# Patient Record
Sex: MALE | Race: WHITE | NOT HISPANIC OR LATINO | Employment: FULL TIME | URBAN - METROPOLITAN AREA
[De-identification: names, ages, dates, MRNs, and addresses within clinical notes are randomized per-mention and may not be internally consistent; named-entity substitution may affect disease eponyms.]

---

## 2019-05-17 ENCOUNTER — TRANSCRIBE ORDERS (OUTPATIENT)
Dept: ADMINISTRATIVE | Facility: HOSPITAL | Age: 59
End: 2019-05-17

## 2019-05-17 DIAGNOSIS — R31.9 HEMATURIA, UNSPECIFIED TYPE: Primary | ICD-10-CM

## 2019-05-25 ENCOUNTER — HOSPITAL ENCOUNTER (OUTPATIENT)
Dept: RADIOLOGY | Facility: HOSPITAL | Age: 59
Discharge: HOME | End: 2019-05-25
Payer: COMMERCIAL

## 2019-05-25 DIAGNOSIS — R31.9 HEMATURIA, UNSPECIFIED TYPE: ICD-10-CM

## 2019-05-25 PROCEDURE — 76770 US EXAM ABDO BACK WALL COMP: CPT

## 2019-06-10 ENCOUNTER — OFFICE VISIT (OUTPATIENT)
Dept: UROLOGY | Facility: MEDICAL CENTER | Age: 59
End: 2019-06-10
Payer: COMMERCIAL

## 2019-06-10 VITALS
BODY MASS INDEX: 21.67 KG/M2 | DIASTOLIC BLOOD PRESSURE: 68 MMHG | HEART RATE: 64 BPM | WEIGHT: 160 LBS | SYSTOLIC BLOOD PRESSURE: 122 MMHG | HEIGHT: 72 IN

## 2019-06-10 DIAGNOSIS — N28.1 RENAL CYST, LEFT: ICD-10-CM

## 2019-06-10 DIAGNOSIS — R31.0 GROSS HEMATURIA: Primary | ICD-10-CM

## 2019-06-10 DIAGNOSIS — N32.9 LESION OF BLADDER: ICD-10-CM

## 2019-06-10 DIAGNOSIS — N50.89 EPIDIDYMAL MASS: ICD-10-CM

## 2019-06-10 LAB
SL AMB  POCT GLUCOSE, UA: ABNORMAL
SL AMB LEUKOCYTE ESTERASE,UA: ABNORMAL
SL AMB POCT BILIRUBIN,UA: ABNORMAL
SL AMB POCT BLOOD,UA: ABNORMAL
SL AMB POCT CLARITY,UA: ABNORMAL
SL AMB POCT COLOR,UA: ABNORMAL
SL AMB POCT KETONES,UA: ABNORMAL
SL AMB POCT NITRITE,UA: ABNORMAL
SL AMB POCT PH,UA: 7.5
SL AMB POCT SPECIFIC GRAVITY,UA: 1.02
SL AMB POCT URINE PROTEIN: 100
SL AMB POCT UROBILINOGEN: 1

## 2019-06-10 PROCEDURE — 99205 OFFICE O/P NEW HI 60 MIN: CPT | Performed by: UROLOGY

## 2019-06-10 PROCEDURE — 81003 URINALYSIS AUTO W/O SCOPE: CPT | Performed by: UROLOGY

## 2019-06-10 RX ORDER — MULTIVITAMIN
1 TABLET ORAL DAILY
COMMUNITY

## 2019-06-12 ENCOUNTER — TELEPHONE (OUTPATIENT)
Dept: UROLOGY | Facility: CLINIC | Age: 59
End: 2019-06-12

## 2019-06-13 PROBLEM — N32.9 LESION OF BLADDER: Status: ACTIVE | Noted: 2019-06-13

## 2019-06-17 ENCOUNTER — TRANSCRIBE ORDERS (OUTPATIENT)
Dept: ADMINISTRATIVE | Facility: HOSPITAL | Age: 59
End: 2019-06-17

## 2019-06-17 ENCOUNTER — HOSPITAL ENCOUNTER (OUTPATIENT)
Dept: RADIOLOGY | Facility: HOSPITAL | Age: 59
Discharge: HOME | End: 2019-06-17
Payer: COMMERCIAL

## 2019-06-17 DIAGNOSIS — N32.9 LESION OF BLADDER: ICD-10-CM

## 2019-06-17 DIAGNOSIS — N28.1 RENAL CYST, LEFT: ICD-10-CM

## 2019-06-17 DIAGNOSIS — N50.89 EPIDIDYMAL MASS: ICD-10-CM

## 2019-06-17 DIAGNOSIS — R31.0 GROSS HEMATURIA: ICD-10-CM

## 2019-06-17 PROCEDURE — 74178 CT ABD&PLV WO CNTR FLWD CNTR: CPT

## 2019-06-17 PROCEDURE — 76870 US EXAM SCROTUM: CPT

## 2019-06-17 PROCEDURE — 71046 X-RAY EXAM CHEST 2 VIEWS: CPT

## 2019-06-17 RX ADMIN — IOHEXOL 100 ML: 350 INJECTION, SOLUTION INTRAVENOUS at 11:26

## 2019-06-24 RX ORDER — ACETAMINOPHEN 325 MG/1
650 TABLET ORAL EVERY 6 HOURS PRN
COMMUNITY

## 2019-07-01 PROCEDURE — NC001 PR NO CHARGE: Performed by: UROLOGY

## 2019-07-01 NOTE — H&P
History and physical examination    Assessment/Plan      Assessment:  Gross painless hematuria  Bladder mass on ultrasound  Left mildly complex renal cyst possible rim calcification  BPH with lower urinary tract symptoms  Right epididymal mass  Plan:  Cystoscopy TURBT intravesical mitomycin-C  CT urogram  Eventual treatment for BPH  Scrotal ultrasound for read epididymal mass     History of Present Illness          HPI:  Quique Brooks is a 60 y  o  male who presents with a history of intermittent gross painless hematuria   The patient notes that approximately 2 weeks ago he urinated noticed gross blood in his urine with clots   This resolved spontaneously but since that time he has had at least 2 other episodes of transient gross painless hematuria   The patient was seen at the doctor is in clinic 05/12/2019 at which time blood work revealed a normal creatinine approximately 0 9 and a normal PSA of 1 1   Renal ultrasound however identified a mildly complex left renal cyst with some rim calcification and internal septation as well as a 1 3 cm hypoechoic bladder mass   The patient is now admitted for cystoscopy probable TURBT and intravesical instillation of mitomycin-C   CT urogram will be ordered prior to his admission      The patient also has an AUA symptom score 14 noting nocturia once nightly with a 3/5 weakening of the urinary stream a 4/5 urgency and intermittency quotient with a 2/5 frequency quotient  The patient and I discussed potential risks for the above-noted procedures including infection bleeding perforation stricture need for prolonged catheterization possible local or distant spread of bladder cancer possible false negatives and possible complications of intravenous contrast including renal failure or allergy   The patient agrees to the above procedures      Review of Systems   Constitutional: Negative     HENT: Negative     Eyes: Negative     Respiratory: Negative     Cardiovascular: Negative     Gastrointestinal: Negative     Endocrine: Negative     Genitourinary: Positive for frequency, hematuria and urgency  Skin: Negative     Allergic/Immunologic: Negative     Neurological: Negative     Hematological: Negative     Psychiatric/Behavioral: Negative           Historical Information          Medical History           Past Medical History:   Diagnosis Date    Back problem           Surgical History             Past Surgical History:   Procedure Laterality Date    LUMBAR DISC SURGERY   1994    LUMBAR DISC SURGERY   55         Social History          Social History              Substance and Sexual Activity   Alcohol Use Yes    Frequency: 2-3 times a week    Drinks per session: 1 or 2      Social History            Substance and Sexual Activity   Drug Use Not on file      Social History            Tobacco Use   Smoking Status Never Smoker   Smokeless Tobacco Never Used      Family History:             Family History   Problem Relation Age of Onset    Liver cancer Mother      Heart disease Father           Meds/Allergies   all medications and allergies reviewed  No Known Allergies     Objective   Vitals: Blood pressure 122/68, pulse 64, height 6' (1 829 m), weight 72 6 kg (160 lb)     [unfilled]           Invasive Devices      None                      Physical Exam   Constitutional: He is oriented to person, place, and time  He appears well-developed and well-nourished  No distress  HENT:   Head: Normocephalic and atraumatic  Eyes: Pupils are equal, round, and reactive to light  EOM are normal    Neck: Normal range of motion  Neck supple  Cardiovascular: Normal rate, regular rhythm, normal heart sounds and intact distal pulses  Exam reveals no gallop and no friction rub    No murmur heard  Pulmonary/Chest: Effort normal and breath sounds normal  No stridor  No respiratory distress  He has no wheezes  He has no rales  He exhibits no tenderness     Abdominal: Soft  Bowel sounds are normal  He exhibits no distension and no mass  There is no tenderness  There is no rebound and no guarding  No hernia  Genitourinary:   Genitourinary Comments: Phallus normal circumcised without cutaneous lesions   Meatus patent normally placed   Testes adnexa and scrotum all normal without masses induration tenderness   There is a firm round 1 cm mass involving the head of the right epididymis consistent with either a solid or cystic lesion of the epididymal head   Digital rectal examination reveals normal anal verge normal anal sphincter tone no palpable rectal masses and a 35-40 g a nodular nontender prostate   Neurological: He is alert and oriented to person, place, and time  Skin: Skin is warm and dry  He is not diaphoretic  Psychiatric: He has a normal mood and affect  His behavior is normal  Judgment and thought content normal    Vitals reviewed         Lab Results: I have personally reviewed pertinent reports     Imaging: I have personally reviewed pertinent reports            US kidney and bladder   Status: Final result   PACS Images       Show images for US kidney and bladder   Study Result      RENAL ULTRASOUND     INDICATION:   R31 9: Hematuria, unspecified      COMPARISON: None     TECHNIQUE:   Ultrasound of the retroperitoneum was performed with a curvilinear transducer utilizing volumetric sweeps and still imaging techniques       FINDINGS:     KIDNEYS:  Symmetric and normal size  Right kidney:  11 6 x 4 9 cm  Left kidney:  11 2 x 6 1 cm      Right kidney  Normal echogenicity and contour  No suspicious masses detected  No hydronephrosis  No shadowing calculi  No perinephric fluid collections      Left kidney  Normal echogenicity and contour  No suspicious masses detected   1 1 x 1 0 x 0 8 cm hypoechoic focus at the lower pole with possible mild rim calcification and thin internal septation  No hydronephrosis  No shadowing calculi    No perinephric fluid collections      URETERS:  Nonvisualized      BLADDER:   Normally distended  Hypoechoic mass noted at the right anterior aspect of the bladder measuring 1 3 x 1 3 x 1 1 cm  No definite internal vascularity  Bilateral ureteral jets detected  Moderate prominence of the prostate gland with scattered calcifications  Focal prominence of the prostate gland anteriorly which impresses on the posterior aspect of the bladder      IMPRESSION:  1   Hypoechoic mass at the right anterior aspect of the bladder wall measuring up to 1 3 cm  Neoplasm should be excluded  Recommend follow-up with cystoscopy  2  Delbra Lindau complex cyst suggested in the left kidney measuring up to 1 1 cm in size  3  Enlarged prostate      The study was marked in [facility] for immediate notification      Workstation performed: [id number]      Imaging      US kidney and bladder (Order: [id number]) - 5/25/2019   Result History      US kidney and bladder (Order #[id number]) on 5/25/2019 - Order Result History Report   Order Report      Order [id number]  Reason for Exam      Dx:  Hematuria, unspecified type [R31 9 (ICD-10-CM)]   Signed by      Signed Date/Time   Phone Pager   Katlin Zhang 5/25/2019 14:18 [phone]     Exam Information                      Status Exam Begun   Exam Ended     Final [99] 5/25/2019 11:53 5/25/2019 12:14   External Results Report      Open External Results Report    Encounter      View Encounter                    Patient Care Timeline      No data selected in time range   Pre-op Summary      Pre-op            Recovery Summary      Recovery              Routing History      None         EKG, Pathology, and Other Studies: I have personally reviewed pertinent reports     VTE Prophylaxis: Sequential compression device (Venodyne)

## 2019-07-02 ENCOUNTER — APPOINTMENT (OUTPATIENT)
Dept: LAB | Facility: HOSPITAL | Age: 59
End: 2019-07-02
Payer: COMMERCIAL

## 2019-07-02 ENCOUNTER — OFFICE VISIT (OUTPATIENT)
Dept: LAB | Facility: HOSPITAL | Age: 59
End: 2019-07-02

## 2019-07-02 DIAGNOSIS — N32.9 LESION OF BLADDER: ICD-10-CM

## 2019-07-02 PROCEDURE — 87086 URINE CULTURE/COLONY COUNT: CPT

## 2019-07-03 LAB — BACTERIA UR CULT: NORMAL

## 2019-07-03 NOTE — PROGRESS NOTES
There are no diagnoses linked to this encounter  Assessment and plan:       1  Bladder lesion  -  Patient is scheduled to undergo cystoscopy with trans urethral resection of bladder tumor on 07/12/2019 with Dr Lew Moyer  -  Imaging was performed continuing to indicate a bladder lesion, no metastatic disease identified   -  Risks and benefits of the procedure were discussed including cardiopulmonary risk, DVT, infection, bleeding, pain, damage to nearby anatomical structures, amongst others  Claude Giron PA-C      Chief Complaint     Chief Complaint   Patient presents with    Pre-op Exam     gross hematuria/dr Marcie Santiago         History of Present Illness     Connie Ku is a 60 y o  male patient known to Dr Lew Moyer for a history of gross hematuria  He was seen in consultation on 06/10/2019 reporting transient episodes of gross hematuria with clots present  Blood work had been done prior revealing a normal creatinine of 0 9 as well as a PSA of 1 1  Renal ultrasound had been completed identifying a mildly complex left renal cyst with some calcification and internal septation  Additionally, it identified 1 3 cm hypoechoic bladder mass  Chest x-ray, CT urogram, an ultrasound of the scrotum and testicles were performed on 06/17/2019  Chest x-ray was negative for any abnormalities  Ultrasound of the scrotum and testicles was positive only for small bilateral hydroceles and a left varicocele  CT urogram continues to demonstrate a mass along the wall of the urinary bladder as well as prostatic enlargement  This showed no evidence of metastatic disease  Urinalysis performed 7/2/2019 was positive for red blood cells and 0-1 WBC/hpf  This has been sent for culture which was negative  Patient takes no prescription medications  He currently reports taking Tylenol as needed, multivitamin, and saw palmetto      Patient has no smoking history, he reports no chemical exposure history, he works as an   His past medical history includes a couple of procedures for his back  He does not have a primary care provider  Laboratory     No results found for: CREATININE    No results found for: PSA    No results found for this or any previous visit (from the past 1 hour(s))  Review of Systems     Review of Systems   Constitutional: Negative for chills and fever  HENT: Negative  Respiratory: Negative for shortness of breath  Cardiovascular: Negative for chest pain  Gastrointestinal: Negative for constipation, diarrhea, nausea and vomiting  Genitourinary: Positive for frequency, hematuria (no clots for the last 10 days) and urgency  Negative for difficulty urinating, dysuria, enuresis and flank pain  Musculoskeletal: Positive for back pain  Allergies     No Known Allergies    Physical Exam     Physical Exam   Constitutional: He is oriented to person, place, and time  He appears well-developed and well-nourished  No distress  HENT:   Head: Normocephalic and atraumatic  Right Ear: External ear normal    Left Ear: External ear normal    Nose: Nose normal    Eyes: Right eye exhibits no discharge  Left eye exhibits no discharge  No scleral icterus  Cardiovascular: Normal rate, regular rhythm, normal heart sounds and intact distal pulses  Exam reveals no gallop and no friction rub  No murmur heard  Pulmonary/Chest: Effort normal and breath sounds normal  No stridor  No respiratory distress  He has no wheezes  He has no rales  Neurological: He is alert and oriented to person, place, and time  Skin: Skin is warm and dry  He is not diaphoretic  Psychiatric: He has a normal mood and affect  His behavior is normal  Judgment and thought content normal    Nursing note and vitals reviewed          Vital Signs     Vitals:    07/05/19 1515   BP: 112/88   BP Location: Left arm   Patient Position: Sitting   Cuff Size: Adult   Pulse: 66   Weight: 75 8 kg (167 lb) Height: 6' (1 829 m)         Current Medications       Current Outpatient Medications:     acetaminophen (TYLENOL) 325 mg tablet, Take 650 mg by mouth every 6 (six) hours as needed for mild pain, Disp: , Rfl:     Multiple Vitamin (MULTIVITAMIN) tablet, Take 1 tablet by mouth daily, Disp: , Rfl:     Saw Palmetto, Serenoa repens, (SAW PALMETTO PO), Take by mouth daily, Disp: , Rfl:       Active Problems     Patient Active Problem List   Diagnosis    Lesion of bladder         Past Medical History     Past Medical History:   Diagnosis Date    Back pain     Bladder tumor     DDD (degenerative disc disease), lumbar     Hematuria     Wears glasses          Surgical History     Past Surgical History:   Procedure Laterality Date    DENTAL SURGERY      LUMBAR [address]         Family History     Family History   Problem Relation Age of Onset    Liver cancer Mother     Heart disease Father          Social History     Social History       Radiology     CHEST      INDICATION:   N32 9: Bladder disorder, unspecified      COMPARISON:  None     EXAM PERFORMED/VIEWS:  XR CHEST PA & LATERAL  Images: 2     FINDINGS:     Cardiomediastinal silhouette appears unremarkable  Pulmonary vessels are normal      The lungs are clear  No pneumothorax or pleural effusion      Osseous structures appear within normal limits for patient age      IMPRESSION:     No acute cardiopulmonary disease      CT ABDOMEN AND PELVIS WITH AND WITHOUT IV CONTRAST     INDICATION:   R31 0: Gross hematuria  N32 9: Bladder disorder, unspecified  N28 1: Cyst of kidney, acquired    Concern for bladder mass seen on ultrasound        COMPARISON:  Ultrasound, dated May 25, 2019      TECHNIQUE: Initial CT of the abdomen and pelvis was performed without intravenous contrast   Subsequent dynamic CT evaluation of the abdomen and pelvis was performed after the administration of intravenous contrast in both nephrographic and delayed   phases after the administration of intravenous contrast   Axial, sagittal, and coronal 2D reformatted images were created from the source data and submitted for interpretation       Radiation dose length product (DLP) for this visit:  1033 63 mGy-cm   This examination, like all CT scans performed in the [facility], was performed utilizing techniques to minimize radiation dose exposure, including the use of   iterative reconstruction and automated exposure control      IV Contrast:  100 mL of iohexol (OMNIPAQUE)  Enteric Contrast:  Enteric contrast was not administered      FINDINGS:     ABDOMEN     RIGHT KIDNEY AND URETER:  No solid renal mass  No detectable urothelial mass  No hydronephrosis or hydroureter  No urinary tract calculi  No perinephric collection      LEFT KIDNEY AND URETER:  No solid renal mass  No detectable urothelial mass  Note is made of a left lower pole simple cyst   No hydronephrosis or hydroureter  No urinary tract calculi  No perinephric collection      URINARY BLADDER:  A hyperattenuating mass along the right anterolateral wall of the urinary bladder measures approximately 3 x 1 5 cm in greatest transverse dimensions (series 3, image 149)  No calculi         LOWER CHEST:  No clinically significant abnormality identified in the visualized lower chest      LIVER/BILIARY TREE:  Incidental note is made of either a simple cyst or a 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      GALLBLADDER:  No calcified gallstones  No pericholecystic inflammatory change      SPLEEN:  Unremarkable      PANCREAS:  Unremarkable      ADRENAL GLANDS:  Unremarkable      STOMACH AND BOWEL:  Unremarkable      ABDOMINOPELVIC CAVITY:  No ascites  No free intraperitoneal air   No lymphadenopathy      VESSELS:  Unremarkable for patient's age      PELVIS     REPRODUCTIVE ORGANS:  The prostate is enlarged      APPENDIX: No findings to suggest appendicitis      ABDOMINAL WALL/INGUINAL REGIONS:  Unremarkable      OSSEOUS STRUCTURES:  There are age appropriate degenerative changes  No acute fracture or destructive osseous lesion      IMPRESSION:     1   Redemonstrated mass along the right anterolateral wall of the urinary bladder, measuring approximately 3 x 1 5 cm in greatest transverse dimensions  Once again, tissue sampling via cystoscopy is recommended  2   No evidence of metastatic disease  3   Prostatic enlargement  SCROTAL ULTRASOUND     INDICATION:    N50 9: Disorder of male genital organs, unspecified      COMPARISON: None     TECHNIQUE:   Ultrasound the scrotal contents was performed with a high frequency linear transducer utilizing volumetric sweep imaging as well as standard still image techniques  Imaging performed in longitudinal and transverse orientation  Color and   spectral Doppler evaluation also performed bilaterally      FINDINGS:     TESTES:   Testes are symmetric and normal in size      RIGHT testis = 4 8 x 2 1 x 3 6 cm   Normal contour with homogeneous smooth echotexture  No intratesticular mass lesion or calcifications      LEFT testis = 5 0 x 2 1 x 3 8 cm  Normal contour with homogeneous smooth echotexture  No intratesticular mass lesion or calcifications      Doppler flow within both testes is present and symmetric      EPIDIDYMIDES:   The epididymides are normal in size  Both epididymides L heads contain cysts  The right epididymal head measures 1 0 x 1 1 cm and contains a 1 0 x 0 8 cm cyst   This cyst demonstrates a punctate focus of echogenicity along its posterior wall suggesting   calcification      The left epididymal head cyst is smaller, measuring 0 6 x 0 4 cm  Doppler ultrasound demonstrates normal blood flow  No epididymal lesions      HYDROCELE:  Small bilateral hydroceles      VARICOCELE:  Small left varicocele is present      SCROTUM:  Scrotal thickness and appearance within normal limits   No evidence for extratesticular mass or hernia demonstrated      IMPRESSION:     Bilateral epididymal head cysts, measuring approximately 1 cm on the right and 0 6 cm on the left      Unremarkable testes      Small bilateral hydroceles and left varicocele

## 2019-07-05 ENCOUNTER — OFFICE VISIT (OUTPATIENT)
Dept: UROLOGY | Facility: CLINIC | Age: 59
End: 2019-07-05
Payer: COMMERCIAL

## 2019-07-05 VITALS
HEART RATE: 66 BPM | WEIGHT: 167 LBS | SYSTOLIC BLOOD PRESSURE: 112 MMHG | DIASTOLIC BLOOD PRESSURE: 88 MMHG | BODY MASS INDEX: 22.62 KG/M2 | HEIGHT: 72 IN

## 2019-07-05 DIAGNOSIS — N32.9 LESION OF BLADDER: Primary | ICD-10-CM

## 2019-07-05 PROCEDURE — 99213 OFFICE O/P EST LOW 20 MIN: CPT | Performed by: PHYSICIAN ASSISTANT

## 2019-07-11 ENCOUNTER — ANESTHESIA EVENT (OUTPATIENT)
Dept: PERIOP | Facility: HOSPITAL | Age: 59
End: 2019-07-11
Payer: COMMERCIAL

## 2019-07-12 ENCOUNTER — ANESTHESIA (OUTPATIENT)
Dept: PERIOP | Facility: HOSPITAL | Age: 59
End: 2019-07-12
Payer: COMMERCIAL

## 2019-07-12 ENCOUNTER — HOSPITAL ENCOUNTER (OUTPATIENT)
Facility: HOSPITAL | Age: 59
Setting detail: OUTPATIENT SURGERY
Discharge: HOME | End: 2019-07-13
Attending: UROLOGY | Admitting: UROLOGY
Payer: COMMERCIAL

## 2019-07-12 DIAGNOSIS — N32.9 LESION OF BLADDER: ICD-10-CM

## 2019-07-12 PROCEDURE — 52240 CYSTOSCOPY AND TREATMENT: CPT | Performed by: UROLOGY

## 2019-07-12 PROCEDURE — 88307 TISSUE EXAM BY PATHOLOGIST: CPT | Performed by: PATHOLOGY

## 2019-07-12 RX ORDER — ONDANSETRON 2 MG/ML
INJECTION INTRAMUSCULAR; INTRAVENOUS AS NEEDED
Status: DISCONTINUED | OUTPATIENT
Start: 2019-07-12 | End: 2019-07-12 | Stop reason: SURG

## 2019-07-12 RX ORDER — ONDANSETRON 2 MG/ML
4 INJECTION INTRAMUSCULAR; INTRAVENOUS EVERY 6 HOURS PRN
Status: DISCONTINUED | OUTPATIENT
Start: 2019-07-12 | End: 2019-07-13 | Stop reason: HOSPADM

## 2019-07-12 RX ORDER — OXYCODONE HYDROCHLORIDE AND ACETAMINOPHEN 5; 325 MG/1; MG/1
1 TABLET ORAL EVERY 4 HOURS PRN
Status: DISCONTINUED | OUTPATIENT
Start: 2019-07-12 | End: 2019-07-13 | Stop reason: HOSPADM

## 2019-07-12 RX ORDER — DEXAMETHASONE SODIUM PHOSPHATE 4 MG/ML
INJECTION, SOLUTION INTRA-ARTICULAR; INTRALESIONAL; INTRAMUSCULAR; INTRAVENOUS; SOFT TISSUE AS NEEDED
Status: DISCONTINUED | OUTPATIENT
Start: 2019-07-12 | End: 2019-07-12 | Stop reason: SURG

## 2019-07-12 RX ORDER — OXYBUTYNIN CHLORIDE 5 MG/1
5 TABLET ORAL 2 TIMES DAILY PRN
Status: DISCONTINUED | OUTPATIENT
Start: 2019-07-12 | End: 2019-07-13 | Stop reason: HOSPADM

## 2019-07-12 RX ORDER — CEFAZOLIN SODIUM 2 G/50ML
2000 SOLUTION INTRAVENOUS ONCE
Status: COMPLETED | OUTPATIENT
Start: 2019-07-12 | End: 2019-07-12

## 2019-07-12 RX ORDER — SULFAMETHOXAZOLE AND TRIMETHOPRIM 800; 160 MG/1; MG/1
1 TABLET ORAL DAILY
Qty: 7 TABLET | Refills: 0 | Status: SHIPPED | OUTPATIENT
Start: 2019-07-12 | End: 2019-07-19

## 2019-07-12 RX ORDER — ONDANSETRON 2 MG/ML
4 INJECTION INTRAMUSCULAR; INTRAVENOUS ONCE AS NEEDED
Status: DISCONTINUED | OUTPATIENT
Start: 2019-07-12 | End: 2019-07-12 | Stop reason: HOSPADM

## 2019-07-12 RX ORDER — MAGNESIUM HYDROXIDE 1200 MG/15ML
LIQUID ORAL AS NEEDED
Status: DISCONTINUED | OUTPATIENT
Start: 2019-07-12 | End: 2019-07-12 | Stop reason: HOSPADM

## 2019-07-12 RX ORDER — EPHEDRINE SULFATE 50 MG/ML
INJECTION INTRAVENOUS AS NEEDED
Status: DISCONTINUED | OUTPATIENT
Start: 2019-07-12 | End: 2019-07-12 | Stop reason: SURG

## 2019-07-12 RX ORDER — DEXTROSE, SODIUM CHLORIDE, AND POTASSIUM CHLORIDE 5; .45; .15 G/100ML; G/100ML; G/100ML
75 INJECTION INTRAVENOUS CONTINUOUS
Status: DISCONTINUED | OUTPATIENT
Start: 2019-07-12 | End: 2019-07-13 | Stop reason: HOSPADM

## 2019-07-12 RX ORDER — OXYBUTYNIN CHLORIDE 5 MG/1
5 TABLET ORAL 2 TIMES DAILY PRN
Qty: 25 TABLET | Refills: 0 | Status: SHIPPED | OUTPATIENT
Start: 2019-07-12

## 2019-07-12 RX ORDER — ACETAMINOPHEN 325 MG/1
650 TABLET ORAL EVERY 6 HOURS PRN
Status: DISCONTINUED | OUTPATIENT
Start: 2019-07-12 | End: 2019-07-13 | Stop reason: HOSPADM

## 2019-07-12 RX ORDER — PROPOFOL 10 MG/ML
INJECTION, EMULSION INTRAVENOUS AS NEEDED
Status: DISCONTINUED | OUTPATIENT
Start: 2019-07-12 | End: 2019-07-12 | Stop reason: SURG

## 2019-07-12 RX ORDER — HYDROMORPHONE HYDROCHLORIDE 2 MG/ML
INJECTION, SOLUTION INTRAMUSCULAR; INTRAVENOUS; SUBCUTANEOUS AS NEEDED
Status: DISCONTINUED | OUTPATIENT
Start: 2019-07-12 | End: 2019-07-12 | Stop reason: SURG

## 2019-07-12 RX ORDER — FENTANYL CITRATE/PF 50 MCG/ML
25 SYRINGE (ML) INJECTION
Status: DISCONTINUED | OUTPATIENT
Start: 2019-07-12 | End: 2019-07-12 | Stop reason: HOSPADM

## 2019-07-12 RX ORDER — MIDAZOLAM HYDROCHLORIDE 1 MG/ML
INJECTION INTRAMUSCULAR; INTRAVENOUS AS NEEDED
Status: DISCONTINUED | OUTPATIENT
Start: 2019-07-12 | End: 2019-07-12 | Stop reason: SURG

## 2019-07-12 RX ORDER — SODIUM CHLORIDE 9 MG/ML
125 INJECTION, SOLUTION INTRAVENOUS CONTINUOUS
Status: DISCONTINUED | OUTPATIENT
Start: 2019-07-12 | End: 2019-07-12

## 2019-07-12 RX ORDER — FENTANYL CITRATE 50 UG/ML
INJECTION, SOLUTION INTRAMUSCULAR; INTRAVENOUS AS NEEDED
Status: DISCONTINUED | OUTPATIENT
Start: 2019-07-12 | End: 2019-07-12 | Stop reason: SURG

## 2019-07-12 RX ADMIN — FENTANYL CITRATE 50 MCG: 50 INJECTION, SOLUTION INTRAMUSCULAR; INTRAVENOUS at 14:38

## 2019-07-12 RX ADMIN — EPHEDRINE SULFATE 10 MG: 50 INJECTION, SOLUTION INTRAVENOUS at 14:44

## 2019-07-12 RX ADMIN — PROPOFOL 200 MG: 10 INJECTION, EMULSION INTRAVENOUS at 14:38

## 2019-07-12 RX ADMIN — FENTANYL CITRATE 50 MCG: 50 INJECTION, SOLUTION INTRAMUSCULAR; INTRAVENOUS at 14:47

## 2019-07-12 RX ADMIN — FENTANYL CITRATE 25 MCG: 50 INJECTION INTRAMUSCULAR; INTRAVENOUS at 16:33

## 2019-07-12 RX ADMIN — HYDROMORPHONE HYDROCHLORIDE 0.5 MG: 2 INJECTION, SOLUTION INTRAMUSCULAR; INTRAVENOUS; SUBCUTANEOUS at 15:08

## 2019-07-12 RX ADMIN — OXYCODONE HYDROCHLORIDE AND ACETAMINOPHEN 1 TABLET: 5; 325 TABLET ORAL at 18:34

## 2019-07-12 RX ADMIN — CEFAZOLIN SODIUM 2000 MG: 2 SOLUTION INTRAVENOUS at 14:32

## 2019-07-12 RX ADMIN — LIDOCAINE HYDROCHLORIDE 100 MG: 20 INJECTION, SOLUTION INTRAVENOUS at 14:38

## 2019-07-12 RX ADMIN — ONDANSETRON HYDROCHLORIDE 4 MG: 2 INJECTION, SOLUTION INTRAVENOUS at 15:23

## 2019-07-12 RX ADMIN — DEXAMETHASONE SODIUM PHOSPHATE 4 MG: 4 INJECTION, SOLUTION INTRAMUSCULAR; INTRAVENOUS at 14:49

## 2019-07-12 RX ADMIN — SODIUM CHLORIDE 125 ML/HR: 0.9 INJECTION, SOLUTION INTRAVENOUS at 12:35

## 2019-07-12 RX ADMIN — FENTANYL CITRATE 25 MCG: 50 INJECTION INTRAMUSCULAR; INTRAVENOUS at 16:28

## 2019-07-12 RX ADMIN — DEXTROSE, SODIUM CHLORIDE, AND POTASSIUM CHLORIDE 75 ML/HR: 5; .45; .15 INJECTION INTRAVENOUS at 18:06

## 2019-07-12 RX ADMIN — MIDAZOLAM 2 MG: 1 INJECTION INTRAMUSCULAR; INTRAVENOUS at 14:31

## 2019-07-12 RX ADMIN — HYDROMORPHONE HYDROCHLORIDE 0.5 MG: 2 INJECTION, SOLUTION INTRAMUSCULAR; INTRAVENOUS; SUBCUTANEOUS at 15:18

## 2019-07-12 RX ADMIN — OXYBUTYNIN CHLORIDE 5 MG: 5 TABLET ORAL at 21:48

## 2019-07-12 NOTE — OP NOTE
OPERATIVE REPORT  PATIENT NAME: Maxine Singh    :  1960  MRN: [id number]  Pt Location: [facility] OR ROOM 08    SURGERY DATE: 2019    Surgeon(s) and Role:     * Alfonso Sprague MD - Primary    Preop Diagnosis:  Lesion of bladder [N32 9]  Gross hematuria  Post-Op Diagnosis Codes:     * Lesions of bladder [N32 9]  Gross hematuria    Procedure(s) (LRB):  CYSTOSCOPY (N/A)  INSTILLATION MITOMYCIN (N/A)  TURBT (N/A)-->5cm-right lateral wall bladder    Specimen(s):  ID Type Source Tests Collected by Time Destination   1 : Bladder Tumor  Tissue Urinary Bladder TISSUE EXAM Alfonso Sprague MD 2019 1512        Estimated Blood Loss:   Minimal    Drains:  Urethral Catheter Double-lumen; Latex 20 Fr  (Active)   Number of days: 0       Anesthesia Type:   General    Operative Indications:  Lesion of bladder [N32 9]  Gross hematuria    Operative Findings:  Normal urethra without stricture or lesion although the urethra was somewhat small in caliber and required urethral dilation with Carleene Cordial sounds to a 32 German size in order to accommodate a 24 Western Nikole resectoscope  Moderate BPH with bilobar enlargement of the lateral lobes of the prostate but no significant obstruction visually to the bladder outlet  Urinary bladder revealed a raised lesion appeared somewhat papillary but also had indistinct margins and a sessile character approximately 55 cm in size with a smaller satellite lesion higher up in more distal within the urinary bladder on the right lateral wall  On the left posterior wall there was a hemorrhagic lesion fossa which was also resected  Complications:   None    Procedure and Technique:  The patient was seen in the holding area and we discussed cystoscopy TURBT mitomycin-C as well as discussed his CT urogram findings and his ultrasound of the scrotum and those findings  The patient expressed understanding of all of the findings discussed as outlined in both of of those reports      The patient was taken to the operating room identified by the surgeon placed on the operating table in the dorsal lithotomy position and general LMA anesthesia induced  After time-out cystourethroscopy took place using a 20 Chadian cystoscope with 30 and 70 degree lenses  The anterior urethra was within normal limits without stricture lesion  The posterior urethra revealed bilobar enlargement of the lateral lobes of the prostate without visual occlusion of the bladder outlet  Examination of the urinary bladder with 30 and 70 degree lenses took place  Ureteral orifices were normal both in configuration and position with clear efflux bilaterally  There was no trauma to the ureteral orifices throughout the entire procedure  The mucosa of the urinary bladder was somewhat erythematous particularly at the trigone  On the right  lateral wall of the bladder there was a raised lesion somewhat papillary and somewhat sessile with very indistinct borders measuring approximately 5 cm by estimate  More distal and more anterior was a similar lesion on the right wall of the bladder as well  On the left posterior wall of the bladder there was a hemorrhagic region that did not have the appearance of a malignancy although the right lesion described above did have the appearance of bladder cancer possibly invasive  After completion of cystourethroscopy the cystoscope was removed from the patient and a 25 Western Nikole resectoscopic obturator and sheath was attempted to be placed per urethra into the urinary bladder  This was not able to be performed because of tightness of the ureter and therefore gentle sequential dilation using Aletta Keel sounds of the urethra took place  Dilation took place from 21 Western Nikole up to 26 Western Nikole size inclusive Pauline and then the 24 Western Nikole obturator and sheath was placed per urethra into the urinary bladder without difficulty    Using a type 1 cutting current and 100 the bladder tumor will on the right lateral wall of the bladder as well as the satellite lesion was resected widely and deep into the muscular layer the bladder  There was no evidence of perforation and no evidence of fat at the periphery or at the base of the resection  There was minimal bleeding that was controlled with the coagulation current  No visible tumor was left remaining  After completion of the TURBT hemorrhagic lesion on the left posterior wall the bladder was also resected and the base and periphery fulgurated generously as was the right lateral wall of the bladder  At the end of the procedure the [place] evacuator was used to evacuate all specimen from the bladder and then after the bladder was emptied all equipment removed from the bladder and urethra 40 mg of mitomycin was inserted and left indwelling for 30 minutes after Sandoval catheter 20 Beninese in size was placed per urethra and left to straight drainage  The patient was returned to PACU and after his mitomycin was drained the Sandoval catheter was left in place for bladder healing  Because of the extensive resection the patient will be kept overnight for observation  There were no complications     I was present for the entire procedure and A qualified resident physician was not available    Patient Disposition:  PACU     SIGNATURE: Donte Robert MD  DATE: July 12, 2019  TIME: 3:43 PM

## 2019-07-12 NOTE — INTERVAL H&P NOTE
H&P reviewed  After examining the patient I find no changes in the patients condition since the H&P had been written  /73   Pulse 61   Temp (!) 97 1 °F (36 2 °C) (Tympanic)   Resp 16   Ht 6' (1 829 m)   Wt 74 8 kg (165 lb)   SpO2 99%   BMI 22 38 kg/m²

## 2019-07-12 NOTE — INTERIM OP NOTE
CYSTOSCOPY, INSTILLATION MITOMYCIN, TURBT  Postoperative Note  PATIENT NAME: Sahuna Bocanegra  : 1960  MRN: [id number]  [facility] OR ROOM 08    Surgery Date: 2019    Preop Diagnosis:  Lesion of bladder [N32 9]  Gross hematuria    Post-Op Diagnosis Codes:     * Lesions of bladder [N32 9]  Gross hematuria    Procedure(s) (LRB):  CYSTOSCOPY (N/A)  INSTILLATION MITOMYCIN (N/A)  TURBT (N/A)--5 cm bladder tumor-    Surgeon(s) and Role:     * Myrtis Shone, MD - Primary    Specimens:  ID Type Source Tests Collected by Time Destination   1 : Bladder Tumor  Tissue Urinary Bladder TISSUE EXAM Myrtis Shone, MD 2019 1512        Estimated Blood Loss:   Minimal    Anesthesia Type:   General     Findings:    Papillary 5 cm bladder tumor with smaller satellite lesions on right lateral wall of urinary bladder with small hemorrhagic left posterior wall lesion    Complications:   None    SIGNATURE: Myrtis Shone, MD   DATE: 2019   TIME: 3:41 PM

## 2019-07-12 NOTE — ANESTHESIA PREPROCEDURE EVALUATION
Review of Systems/Medical History  Patient summary reviewed        Cardiovascular  Negative cardio ROS    Pulmonary  Negative pulmonary ROS        GI/Hepatic  Negative GI/hepatic ROS          Negative  ROS        Endo/Other  Negative endo/other ROS      GYN  Negative gynecology ROS          Hematology  Negative hematology ROS      Musculoskeletal  Negative musculoskeletal ROS   Arthritis     Neurology  Negative neurology ROS      Psychology   Negative psychology ROS              Physical Exam    Airway    Mallampati score: II  TM Distance: >3 FB  Neck ROM: full     Dental   No notable dental hx     Cardiovascular  Comment: Negative ROS, Rhythm: regular, Rate: normal, Cardiovascular exam normal    Pulmonary  Pulmonary exam normal Breath sounds clear to auscultation,     Other Findings        Anesthesia Plan  ASA Score- 1     Anesthesia Type- general with ASA Monitors  Additional Monitors:   Airway Plan: LMA  Plan Factors-    Induction- intravenous  Postoperative Plan-     Informed Consent- Anesthetic plan and risks discussed with patient

## 2019-07-13 VITALS
TEMPERATURE: 96 F | HEIGHT: 72 IN | HEART RATE: 73 BPM | BODY MASS INDEX: 22.4 KG/M2 | SYSTOLIC BLOOD PRESSURE: 102 MMHG | DIASTOLIC BLOOD PRESSURE: 60 MMHG | RESPIRATION RATE: 18 BRPM | WEIGHT: 165.34 LBS | OXYGEN SATURATION: 97 %

## 2019-07-13 PROCEDURE — NC001 PR NO CHARGE: Performed by: UROLOGY

## 2019-07-13 PROCEDURE — 99217 PR OBSERVATION CARE DISCHARGE MANAGEMENT: CPT | Performed by: UROLOGY

## 2019-07-13 RX ADMIN — DEXTROSE, SODIUM CHLORIDE, AND POTASSIUM CHLORIDE 75 ML/HR: 5; .45; .15 INJECTION INTRAVENOUS at 07:08

## 2019-07-13 RX ADMIN — ACETAMINOPHEN 650 MG: 325 TABLET ORAL at 09:04

## 2019-07-13 RX ADMIN — OXYBUTYNIN CHLORIDE 5 MG: 5 TABLET ORAL at 08:59

## 2019-07-13 RX ADMIN — OXYCODONE HYDROCHLORIDE AND ACETAMINOPHEN 1 TABLET: 5; 325 TABLET ORAL at 03:06

## 2019-07-13 NOTE — PROGRESS NOTES
Progress Note - Urology Progress  Kamini Paez 60 y o  male MRN: [id number]  Unit/Bed#: E2 -01 Encounter: [id number]    Assessment:  Feels well POD 1    Plan:  DC to home with leg bag  Oxybutynin, bactrim      Blood pressure 102/60, pulse 73, temperature (!) 96 °F (35 6 °C), temperature source Tympanic, resp  rate 18, height 6' (1 829 m), weight 75 kg (165 lb 5 5 oz), SpO2 97 %  ,Body mass index is 22 42 kg/m²  Intake/Output Summary (Last 24 hours) at 7/13/2019 0858  Last data filed at 7/13/2019 0854  Gross per 24 hour   Intake 2000 ml   Output 2600 ml   Net -600 ml       Invasive Devices     Peripheral Intravenous Line            Peripheral IV 07/12/19 Left Forearm less than 1 day          Drain            Urethral Catheter Double-lumen; Latex 20 Fr  less than 1 day                Physical Exam: General appearance: alert and oriented, in no acute distress  Abdomen: soft, non-tender; bowel sounds normal; no masses,  no organomegaly  Male genitalia: normal    Lab, Imaging and other studies:CBC: No results found for: WBC, HGB, HCT, MCV, PLT, ADJUSTEDWBC, MCH, MCHC, RDW, MPV, NRBC  }

## 2019-07-13 NOTE — DISCHARGE SUMMARY
Discharge Summary - Ann Kothari 60 y o  male MRN: [id number]    Unit/Bed#: E2 -01 Encounter: [id number]    Admission Date: 7/12/2019     Admitting Diagnosis: Lesion of bladder [N32 9]    Discharge Date:   July 13, 2019  HPI: admitted for bladder tumor resection    Procedures Performed: No orders of the defined types were placed in this encounter  TUR bladder tumor   Hospital Course: Uneventful night after surgery  Urine light cherry, drains well  Discharge Diagnosis: Path pending  1  Lesion of bladder        Condition at Discharge: good     Discharge Medications:   See after visit summary for reconciled discharge medications provided to patient and family  Discharge instructions/Information to patient and family:   See after visit summary for information provided to patient and family  Provisions for Follow-Up Care:  See after visit summary for information related to follow-up care and any pertinent home health orders  Disposition: Home    Planned Readmission: No    Discharge Statement   I spent 15 minutes discharging the patient  This time was spent on the day of discharge  I had direct contact with the patient on the day of discharge  Additional documentation is required if more than 30 minutes were spent on discharge       Signature:   Pili Foy MD  Date: 7/13/2019 Time: 9:01 AM

## 2019-07-13 NOTE — PLAN OF CARE
Problem: Potential for Falls  Goal: Patient will remain free of falls  Description  INTERVENTIONS:  - Assess patient frequently for physical needs  -  Identify cognitive and physical deficits and behaviors that affect risk of falls    -  [place] fall precautions as indicated by assessment   - Educate patient/family on patient safety including physical limitations  - Instruct patient to call for assistance with activity based on assessment  - Modify environment to reduce risk of injury  - Consider OT/PT consult to assist with strengthening/mobility  Outcome: Progressing     Problem: PAIN - ADULT  Goal: Verbalizes/displays adequate comfort level or baseline comfort level  Description  Interventions:  - Encourage patient to monitor pain and request assistance  - Assess pain using appropriate pain scale  - Administer analgesics based on type and severity of pain and evaluate response  - Implement non-pharmacological measures as appropriate and evaluate response  - Consider cultural and social influences on pain and pain management  - Notify physician/advanced practitioner if interventions unsuccessful or patient reports new pain  Outcome: Progressing     Problem: SAFETY ADULT  Goal: Maintain or return to baseline ADL function  Description  INTERVENTIONS:  -  Assess patient's ability to carry out ADLs; assess patient's baseline for ADL function and identify physical deficits which impact ability to perform ADLs (bathing, care of mouth/teeth, toileting, grooming, dressing, etc )  - Assess/evaluate cause of self-care deficits   - Assess range of motion  - Assess patient's mobility; develop plan if impaired  - Assess patient's need for assistive devices and provide as appropriate  - Encourage maximum independence but intervene and supervise when necessary  ¯ Involve family in performance of ADLs  ¯ Assess for home care needs following discharge   ¯ Request OT consult to assist with ADL evaluation and planning for discharge  ¯ Provide patient education as appropriate  Outcome: Progressing  Goal: Maintain or return mobility status to optimal level  Description  INTERVENTIONS:  - Assess patient's baseline mobility status (ambulation, transfers, stairs, etc )    - Identify cognitive and physical deficits and behaviors that affect mobility  - Identify mobility aids required to assist with transfers and/or ambulation (gait belt, sit-to-stand, lift, walker, cane, etc )  - [place] fall precautions as indicated by assessment  - Record patient progress and toleration of activity level on Mobility SBAR; progress patient to next Phase/Stage  - Instruct patient to call for assistance with activity based on assessment  - Request Rehabilitation consult to assist with strengthening/weightbearing, etc   Outcome: Progressing     Problem: DISCHARGE PLANNING  Goal: Discharge to home or other facility with appropriate resources  Description  INTERVENTIONS:  - Identify barriers to discharge w/patient and caregiver  - Arrange for needed discharge resources and transportation as appropriate  - Identify discharge learning needs (meds, wound care, etc )  - Arrange for interpretive services to assist at discharge as needed  - Refer to Case Management Department for coordinating discharge planning if the patient needs post-hospital services based on physician/advanced practitioner order or complex needs related to functional status, cognitive ability, or social support system  Outcome: Progressing

## 2019-07-13 NOTE — DISCHARGE INSTRUCTIONS
ALL YOUR  PREVIOUS MEDS ARE THE SAME  NEW MEDS:  OXYBUTYNIN IF NEEDED FOR BLADDER PRESSURE, SPASM     ** STOP TAKING OXYBUTYNIN THE NIGHT BEFORE YOUR CATHETER COMES OUT **      EXPECT SOME BLOOD IN URINE, BURNING, FREQUENT URINATION  Sandoval Catheter Placement and Care   WHAT YOU NEED TO KNOW:   A Sandoval catheter is a sterile tube that is inserted into your bladder to drain urine  It is also called an indwelling urinary catheter  The tip of the catheter has a small balloon filled with solution that holds the catheter inside your bladder  DISCHARGE INSTRUCTIONS:   Return to the emergency department if:   · Your catheter comes out  · You suddenly have material that looks like sand in the tubing or drainage bag  · No urine is draining into the bag and you have checked the system  · You have pain in your hip, back, pelvis, or lower abdomen  · You are confused or cannot think clearly  Contact your healthcare provider if:   · You have a fever  · You have bladder spasms for more than 1 day after the catheter is placed  · You see blood in the tubing or drainage bag  · You have a rash or itching where the catheter tube is secured to your skin  · Urine leaks from or around the catheter, tubing, or drainage bag  · The closed drainage system has accidently come open or apart  · You see a layer of crystals inside the tubing  · You have questions or concerns about your condition or care  Care for your Sandoval catheter:   · Clean your genital area 2 times every day  Clean your catheter and the area around where it was inserted  Use soap and water  Clean your anal opening and catheter area after every bowel movement  · Secure the catheter tube  so you do not pull or move the catheter  This helps prevent pain and bladder spasms  Healthcare providers will show you how to use medical tape or a strap to secure the catheter tube to your body  · Keep a closed drainage system    Your Sandoval catheter should always be attached to the drainage bag to form a closed system  Do not disconnect any part of the closed system unless you need to change the bag  Care for your drainage bag:   · Ask if a leg bag is right for you  A leg bag can be worn under your clothes  Ask your healthcare provider for more information about a leg bag  · Keep the drainage bag below the level of your waist   This helps stop urine from moving back up the tubing and into your bladder  Do not loop or kink the tubing  This can cause urine to back up and collect in your bladder  Do not let the drainage bag touch or lie on the floor  · Empty the drainage bag when needed  The weight of a full drainage bag can be painful  Empty the drainage bag every 3 to 6 hours or when it is ? full  · Clean and change the drainage bag as directed  Ask your healthcare provider how often you should change the drainage bag and what cleaning solution to use  Wear disposable gloves when you change the bag  Do not allow the end of the catheter or tubing to touch anything  Clean the ends with an alcohol pad before you reconnect them  What to do if problems develop:   · No urine is draining into the bag:      ¨ Check for kinks in the tubing and straighten them out  ¨ Check the tape or strap used to secure the catheter tube to your skin  Make sure it is not blocking the tube  ¨ Make sure you are not sitting or lying on the tubing  ¨ Make sure the urine bag is hanging below the level of your waist     · Urine leaks from or around the catheter, tubing, or drainage bag:  Check if the closed drainage system has accidently come open or apart  Clean the catheter and tubing ends with a new alcohol pad and reconnect them  Prevent an infection:   · Wash your hands often  Wash before and after you touch your catheter, tubing, or drainage bag  Use soap and water   Wear clean disposable gloves when you care for your catheter or disconnect the drainage bag  Wash your hands before you prepare or eat food  · Drink liquids as directed  Ask your healthcare provider how much liquid to drink each day and which liquids are best for you  Liquids will help flush your kidneys and bladder to help prevent infection  Follow up with your healthcare provider as directed:  Write down your questions so you remember to ask them during your visits  © 2017 [address] [employer] Information is for End User's use only and may not be sold, redistributed or otherwise used for commercial purposes  All illustrations and images included in CareNotes® are the copyrighted property of [employer] , [employer]  or [employer] Caro  The above information is an  only  It is not intended as medical advice for individual conditions or treatments  Talk to your doctor, nurse or pharmacist before following any medical regimen to see if it is safe and effective for you

## 2019-07-13 NOTE — PLAN OF CARE
Instructed pt on leg bag use, ware catheter care, infection prevention, s/sx to report, and discharge instructions and meds, pt voices understanding

## 2019-07-15 ENCOUNTER — TELEPHONE (OUTPATIENT)
Dept: UROLOGY | Facility: MEDICAL CENTER | Age: 59
End: 2019-07-15

## 2019-07-18 ENCOUNTER — TELEPHONE (OUTPATIENT)
Dept: UROLOGY | Facility: AMBULATORY SURGERY CENTER | Age: 59
End: 2019-07-18

## 2019-07-18 DIAGNOSIS — C67.2 MALIGNANT NEOPLASM OF LATERAL WALL OF URINARY BLADDER (HCC): Primary | ICD-10-CM

## 2019-07-18 NOTE — TELEPHONE ENCOUNTER
Please let pt know biopsy results not reported as of this time    I will call as soon as I have them however

## 2019-07-18 NOTE — TELEPHONE ENCOUNTER
Patients wife is calling to have Dr Patricia Sanchez call patient with biopsy results if they come in before his appointment on 7/24/19

## 2019-07-18 NOTE — PROGRESS NOTES
I had a long discussion with the patient by telephone on 07/18/2019 at 5:25 p m  discussing his pathology results  He understands that he has bladder cancer and is high-grade and invasive into the muscular layer of the bladder  I discussed radical cysto prostatectomy and urinary diversion, neoadjuvant chemotherapy, and chemo radiation therapy for bladder preservation  I also indicated that there are a variety of urinary diversions that have been utilized in the past and that further discussions will take place when he returns to my office  Ambulatory referral to Medical Oncology will take place at this time

## 2019-07-19 ENCOUNTER — TELEPHONE (OUTPATIENT)
Dept: UROLOGY | Facility: CLINIC | Age: 59
End: 2019-07-19

## 2019-07-19 ENCOUNTER — CLINICAL SUPPORT (OUTPATIENT)
Dept: UROLOGY | Facility: MEDICAL CENTER | Age: 59
End: 2019-07-19
Payer: COMMERCIAL

## 2019-07-19 ENCOUNTER — TELEPHONE (OUTPATIENT)
Dept: UROLOGY | Facility: MEDICAL CENTER | Age: 59
End: 2019-07-19

## 2019-07-19 VITALS
BODY MASS INDEX: 22.21 KG/M2 | WEIGHT: 164 LBS | DIASTOLIC BLOOD PRESSURE: 62 MMHG | SYSTOLIC BLOOD PRESSURE: 104 MMHG | HEIGHT: 72 IN | HEART RATE: 66 BPM

## 2019-07-19 DIAGNOSIS — C67.9 MALIGNANT NEOPLASM OF URINARY BLADDER, UNSPECIFIED SITE (HCC): Primary | ICD-10-CM

## 2019-07-19 DIAGNOSIS — C67.2 MALIGNANT NEOPLASM OF LATERAL WALL OF URINARY BLADDER (HCC): Primary | ICD-10-CM

## 2019-07-19 PROCEDURE — 99211 OFF/OP EST MAY X REQ PHY/QHP: CPT

## 2019-07-19 NOTE — TELEPHONE ENCOUNTER
Patient's wife called to [facility] needs records including cystoscopy, TURPT report, pathology report, CT, blood work, and doctor's progress notes  Please fax to [facility] at [phone]

## 2019-07-19 NOTE — TELEPHONE ENCOUNTER
[address] [place] [address] Drive to schedule appt with Medical Oncology for this patient  They will call the pt directly with an appt in the next two to three days  She is trying to find an appt with Dr Jonah Barcenas at [facility]  She had mentioned that they lost 2 docs and have to call the individual site to try to fit patients into the schedule

## 2019-07-19 NOTE — TELEPHONE ENCOUNTER
Called to inform him and his wife that the message they'd left for us concerning his records request was received and that it will be handled by our record-sendout department on Monday

## 2019-07-19 NOTE — TELEPHONE ENCOUNTER
Patient's wife called back and advised that they need the records sent over today because the office is waiting on them  Please advise

## 2019-07-19 NOTE — PROGRESS NOTES
7/19/2019    Darwin Pete is a 60 y o  male  [id number]    Diagnosis:  Bladder Cancer  Chief Complaint     Sandoval Catheter Removal          Patient presents for Sandoval catheter removal s/p Cystoscopy, TURBT on 7/12/19 with Dr Anisha Urbina  Plan:  Referred to Oncology  They will call pt next week for an appointment  Procedure: Sandoval removed without difficulty, patient tolerated procedure well  Urine draining clear yellow  Denies fever or urinary symptoms  Patient instructed to increase fluids, especially water,  and limit caffeine intake  To return to office if unable to void within 4-6 hours, or has increased discomfort      Vitals:    07/19/19 0809   BP: 104/62   Pulse: 66   Weight: 74 4 kg (164 lb)   Height: 6' (1 829 m)         Deborah Anderson LPN

## 2019-07-22 ENCOUNTER — TELEPHONE (OUTPATIENT)
Dept: UROLOGY | Facility: MEDICAL CENTER | Age: 59
End: 2019-07-22

## 2019-07-22 NOTE — TELEPHONE ENCOUNTER
Patient of Dr Leo Maki  Patient is having discomfort and pain and wanted to speak to nurse about issue    Patient not sure if he should be seen sooner than 07/24/2019

## 2019-07-22 NOTE — TELEPHONE ENCOUNTER
Spoke with pt who is having discomfort at tip of penis when he urinates  Stream is narrow at times then improves  Pt feels his bladder is emptying and he denies s/s of UTI  He will call back if he starts having trouble, otherwise f/u in office on schedule on 7-24-19

## 2019-07-24 ENCOUNTER — OFFICE VISIT (OUTPATIENT)
Dept: UROLOGY | Facility: MEDICAL CENTER | Age: 59
End: 2019-07-24
Payer: COMMERCIAL

## 2019-07-24 VITALS
WEIGHT: 164 LBS | HEIGHT: 72 IN | BODY MASS INDEX: 22.21 KG/M2 | SYSTOLIC BLOOD PRESSURE: 96 MMHG | DIASTOLIC BLOOD PRESSURE: 70 MMHG | HEART RATE: 62 BPM

## 2019-07-24 DIAGNOSIS — C67.2 MALIGNANT NEOPLASM OF LATERAL WALL OF URINARY BLADDER (HCC): Primary | ICD-10-CM

## 2019-07-24 PROCEDURE — 87086 URINE CULTURE/COLONY COUNT: CPT | Performed by: UROLOGY

## 2019-07-24 PROCEDURE — 99214 OFFICE O/P EST MOD 30 MIN: CPT | Performed by: UROLOGY

## 2019-07-24 NOTE — PROGRESS NOTES
Chief complaint:  High-grade invasive bladder cancer-postop TURBT    History of present illness:  39-year-old male presents with his wife for conference Chikis Dang after TURBT and intravesical mitomycin therapy  Patient initially presented with gross hematuria and on cystourethroscopy was found to have a right lateral wall mass within the urinary bladder which was resected and found to be invasive into muscularis propria with high-grade features  CT study revealed no evidence of obvious metastatic foci with chest x-ray also being negative  No chest CT has been performed to date  The patient is awaiting consultation with Oncology for neoadjuvant chemotherapy and presents today for further discussion  He also notes some distal urethral discomfort occasional blood from the urethra and urinary urgency and frequency  Long discussion took place concerning the followin  Distal urethral burning and spotting of blood with frequency and urgency-most likely due to the fact that the patient had a very tight urethra requiring dilation prior to TURBT  Urine culture will be obtained as well as bladder scanning for PVR to make sure he is emptying and there is no evidence of acute cystitis  2  High-grade invasive bladder cancer-we discussed neoadjuvant chemotherapy as well as clinical trials, radical cysto prostatectomy with a variety of urinary diversions including ileal conduit orthotopic bladder ileal neobladder  We discussed potential further cystoscopic biopsy particularly of the prostatic urethra showed a neobladder be considered after radical surgery  We also discussed chemo radiation therapy consistent with previous Jesenia Lai studies from [facility]   The patient will be seeing Medical Oncology at [facility] and is also going to be seen for 2nd opinion at [facility] in [place]  All questions were answered      This was 100% counseling 35 minutes

## 2019-07-25 LAB — BACTERIA UR CULT: NORMAL

## 2019-07-26 ENCOUNTER — TELEPHONE (OUTPATIENT)
Dept: HEMATOLOGY ONCOLOGY | Facility: CLINIC | Age: 59
End: 2019-07-26

## 2019-07-30 ENCOUNTER — TELEPHONE (OUTPATIENT)
Dept: UROLOGY | Facility: AMBULATORY SURGERY CENTER | Age: 59
End: 2019-07-30

## 2019-07-30 NOTE — TELEPHONE ENCOUNTER
Quique canceled his appointment with Dr Marii Bailon and did not have any pending appointments with Dr Thuy Heredia  Called Quique  He has a consult visit with [facility] tomorrow and will have treatment there  Instructed him to call if he needs anything in the future

## 2019-09-02 ENCOUNTER — HOSPITAL ENCOUNTER (EMERGENCY)
Facility: HOSPITAL | Age: 59
Discharge: HOME | End: 2019-09-02
Attending: EMERGENCY MEDICINE | Admitting: EMERGENCY MEDICINE
Payer: COMMERCIAL

## 2019-09-02 ENCOUNTER — APPOINTMENT (EMERGENCY)
Dept: RADIOLOGY | Facility: HOSPITAL | Age: 59
End: 2019-09-02
Payer: COMMERCIAL

## 2019-09-02 VITALS
TEMPERATURE: 97.9 F | OXYGEN SATURATION: 99 % | RESPIRATION RATE: 18 BRPM | SYSTOLIC BLOOD PRESSURE: 141 MMHG | WEIGHT: 158 LBS | BODY MASS INDEX: 21.4 KG/M2 | DIASTOLIC BLOOD PRESSURE: 82 MMHG | HEIGHT: 72 IN | HEART RATE: 66 BPM

## 2019-09-02 DIAGNOSIS — N30.90 CYSTITIS: Primary | ICD-10-CM

## 2019-09-02 DIAGNOSIS — R31.9 HEMATURIA: ICD-10-CM

## 2019-09-02 LAB
ALBUMIN SERPL BCP-MCNC: 3.2 G/DL (ref 3.5–5)
ALP SERPL-CCNC: 75 U/L (ref 46–116)
ALT SERPL W P-5'-P-CCNC: 21 U/L (ref 12–78)
ANION GAP SERPL CALCULATED.3IONS-SCNC: 6 MMOL/L (ref 4–13)
APTT PPP: 28 SECONDS (ref 23–37)
AST SERPL W P-5'-P-CCNC: 17 U/L (ref 5–45)
BACTERIA UR QL AUTO: ABNORMAL /HPF
BASOPHILS # BLD AUTO: 0.03 THOUSANDS/ΜL (ref 0–0.1)
BASOPHILS NFR BLD AUTO: 1 % (ref 0–1)
BILIRUB SERPL-MCNC: 0.3 MG/DL (ref 0.2–1)
BILIRUB UR QL STRIP: NEGATIVE
BUN SERPL-MCNC: 15 MG/DL (ref 5–25)
CALCIUM SERPL-MCNC: 8.8 MG/DL (ref 8.3–10.1)
CHLORIDE SERPL-SCNC: 103 MMOL/L (ref 100–108)
CLARITY UR: CLEAR
CO2 SERPL-SCNC: 30 MMOL/L (ref 21–32)
COLOR UR: YELLOW
CREAT SERPL-MCNC: 1.03 MG/DL (ref 0.6–1.3)
EOSINOPHIL # BLD AUTO: 0.04 THOUSAND/ΜL (ref 0–0.61)
EOSINOPHIL NFR BLD AUTO: 1 % (ref 0–6)
ERYTHROCYTE [DISTWIDTH] IN BLOOD BY AUTOMATED COUNT: 11 % (ref 11.6–15.1)
GFR SERPL CREATININE-BSD FRML MDRD: 79 ML/MIN/1.73SQ M
GLUCOSE SERPL-MCNC: 130 MG/DL (ref 65–140)
GLUCOSE UR STRIP-MCNC: NEGATIVE MG/DL
HCT VFR BLD AUTO: 35.6 % (ref 36.5–49.3)
HGB BLD-MCNC: 12 G/DL (ref 12–17)
HGB UR QL STRIP.AUTO: ABNORMAL
IMM GRANULOCYTES # BLD AUTO: 0.01 THOUSAND/UL (ref 0–0.2)
IMM GRANULOCYTES NFR BLD AUTO: 0 % (ref 0–2)
INR PPP: 0.97 (ref 0.91–1.09)
KETONES UR STRIP-MCNC: NEGATIVE MG/DL
LEUKOCYTE ESTERASE UR QL STRIP: NEGATIVE
LYMPHOCYTES # BLD AUTO: 1.81 THOUSANDS/ΜL (ref 0.6–4.47)
LYMPHOCYTES NFR BLD AUTO: 38 % (ref 14–44)
MCH RBC QN AUTO: 30.3 PG (ref 26.8–34.3)
MCHC RBC AUTO-ENTMCNC: 33.7 G/DL (ref 31.4–37.4)
MCV RBC AUTO: 90 FL (ref 82–98)
MONOCYTES # BLD AUTO: 0.12 THOUSAND/ΜL (ref 0.17–1.22)
MONOCYTES NFR BLD AUTO: 3 % (ref 4–12)
NEUTROPHILS # BLD AUTO: 2.81 THOUSANDS/ΜL (ref 1.85–7.62)
NEUTS SEG NFR BLD AUTO: 57 % (ref 43–75)
NITRITE UR QL STRIP: NEGATIVE
NON-SQ EPI CELLS URNS QL MICRO: ABNORMAL /HPF
NRBC BLD AUTO-RTO: 0 /100 WBCS
PH UR STRIP.AUTO: 6.5 [PH]
PLATELET # BLD AUTO: 126 THOUSANDS/UL (ref 149–390)
PMV BLD AUTO: 9.2 FL (ref 8.9–12.7)
POTASSIUM SERPL-SCNC: 3.3 MMOL/L (ref 3.5–5.3)
PROT SERPL-MCNC: 6.8 G/DL (ref 6.4–8.2)
PROT UR STRIP-MCNC: ABNORMAL MG/DL
PROTHROMBIN TIME: 10.5 SECONDS (ref 9.8–12)
RBC # BLD AUTO: 3.96 MILLION/UL (ref 3.88–5.62)
RBC #/AREA URNS AUTO: ABNORMAL /HPF
SODIUM SERPL-SCNC: 139 MMOL/L (ref 136–145)
SP GR UR STRIP.AUTO: <=1.005 (ref 1–1.03)
UROBILINOGEN UR QL STRIP.AUTO: 0.2 E.U./DL
WBC # BLD AUTO: 4.82 THOUSAND/UL (ref 4.31–10.16)
WBC #/AREA URNS AUTO: ABNORMAL /HPF

## 2019-09-02 PROCEDURE — 85025 COMPLETE CBC W/AUTO DIFF WBC: CPT | Performed by: EMERGENCY MEDICINE

## 2019-09-02 PROCEDURE — 96365 THER/PROPH/DIAG IV INF INIT: CPT

## 2019-09-02 PROCEDURE — 85610 PROTHROMBIN TIME: CPT | Performed by: EMERGENCY MEDICINE

## 2019-09-02 PROCEDURE — 74176 CT ABD & PELVIS W/O CONTRAST: CPT

## 2019-09-02 PROCEDURE — 85730 THROMBOPLASTIN TIME PARTIAL: CPT | Performed by: EMERGENCY MEDICINE

## 2019-09-02 PROCEDURE — 96361 HYDRATE IV INFUSION ADD-ON: CPT

## 2019-09-02 PROCEDURE — 80053 COMPREHEN METABOLIC PANEL: CPT | Performed by: EMERGENCY MEDICINE

## 2019-09-02 PROCEDURE — 36415 COLL VENOUS BLD VENIPUNCTURE: CPT | Performed by: EMERGENCY MEDICINE

## 2019-09-02 PROCEDURE — 87086 URINE CULTURE/COLONY COUNT: CPT | Performed by: EMERGENCY MEDICINE

## 2019-09-02 PROCEDURE — 99284 EMERGENCY DEPT VISIT MOD MDM: CPT

## 2019-09-02 PROCEDURE — 81001 URINALYSIS AUTO W/SCOPE: CPT | Performed by: EMERGENCY MEDICINE

## 2019-09-02 RX ORDER — CEPHALEXIN 500 MG/1
500 CAPSULE ORAL 2 TIMES DAILY
Qty: 10 CAPSULE | Refills: 0 | Status: SHIPPED | OUTPATIENT
Start: 2019-09-02 | End: 2019-09-07

## 2019-09-02 RX ORDER — CEFTRIAXONE 1 G/50ML
1000 INJECTION, SOLUTION INTRAVENOUS EVERY 24 HOURS
Status: DISCONTINUED | OUTPATIENT
Start: 2019-09-02 | End: 2019-09-02 | Stop reason: HOSPADM

## 2019-09-02 RX ADMIN — CEFTRIAXONE 1000 MG: 1 INJECTION, SOLUTION INTRAVENOUS at 17:49

## 2019-09-02 RX ADMIN — SODIUM CHLORIDE 1000 ML: 0.9 INJECTION, SOLUTION INTRAVENOUS at 16:28

## 2019-09-02 NOTE — ED PROVIDER NOTES
History  Chief Complaint   Patient presents with    Blood in Urine     started about 1 day ago  Also has pain on urination  Being treated for bladder cancer through [facility] and was told to come to ER  Last round of chemo was Tuesday  RECENT Dx BLADDER CANCER 2 MONTHS AGO, CHEMO VIA [facility]  C/O HEMATURIA, URGENCY, FREQUENCY X 2 DAYS  History provided by:  Patient  Blood in Urine   This is a new problem  The current episode started yesterday  The problem is unchanged  He describes the hematuria as gross hematuria  The hematuria occurs during the terminal portion of his urinary stream  He reports no clotting in his urine stream  The pain is moderate  Irritative symptoms include frequency and urgency  Associated symptoms include abdominal pain  Prior to Admission Medications   Prescriptions Last Dose Informant Patient Reported? Taking?    Multiple Vitamin (MULTIVITAMIN) tablet  Self Yes No   Sig: Take 1 tablet by mouth daily   Saw Palmetto, Serenoa repens, (SAW PALMETTO PO)  Self Yes No   Sig: Take by mouth daily   acetaminophen (TYLENOL) 325 mg tablet  Self Yes No   Sig: Take 650 mg by mouth every 6 (six) hours as needed for mild pain   oxybutynin (DITROPAN) 5 mg tablet  Self No No   Sig: Take 1 tablet (5 mg total) by mouth 2 (two) times a day as needed (Bladder spasm)   Patient not taking: Reported on 7/24/2019      Facility-Administered Medications: None       Past Medical History:   Diagnosis Date    Back pain     Bladder tumor     Bladder tumor     Cancer ([address] )     DDD (degenerative disc disease), lumbar     Hematuria     Wears glasses        Past Surgical History:   Procedure Laterality Date    CYSTOSCOPY N/A 7/12/2019    Procedure: Miladis Land;  Surgeon: Shahrzad Scott MD;  Location: [facility];  Service: Urology   [address] [place] [address] [address]    LUMBAR [address]     [place] CYSTOURETHROSCOPY,FULGUR <0 5 CM LESN N/A 7/12/2019    Procedure: TURBT; Surgeon: Addie Ibrahim MD;  Location: [facility];  Service: Urology    [place] INSTILL ANTICANCER AGENT IN BLADDER N/A 7/12/2019    Procedure: Ashely Woods;  Surgeon: Addie Ibrahim MD;  Location[facility];  Service: Urology       Family History   Problem Relation Age of Onset    Liver cancer Mother     Heart disease Father      I have reviewed and agree with the history as documented  Social History     Tobacco Use    Smoking status: Never Smoker    Smokeless tobacco: Never Used   Substance Use Topics    Alcohol use: Yes     Frequency: 2-3 times a week     Drinks per session: 1 or 2     Comment: rarely    Drug use: Never        Review of Systems   Gastrointestinal: Positive for abdominal pain  Genitourinary: Positive for frequency, hematuria and urgency  All other systems reviewed and are negative  Physical Exam  Physical Exam   Constitutional: He is oriented to person, place, and time  He appears well-developed and well-nourished  No distress  Eyes: Conjunctivae are normal    Neck: Normal range of motion  Cardiovascular: Normal rate and regular rhythm  Pulmonary/Chest: Effort normal and breath sounds normal    Abdominal: Soft  He exhibits no distension  There is no tenderness  Musculoskeletal: Normal range of motion  Neurological: He is alert and oriented to person, place, and time  Skin: Skin is warm and dry  He is not diaphoretic  Nursing note and vitals reviewed        Vital Signs  ED Triage Vitals [09/02/19 1601]   Temperature Pulse Respirations Blood Pressure SpO2   97 9 °F (36 6 °C) 76 18 138/91 99 %      Temp Source Heart Rate Source Patient Position - Orthostatic VS BP Location FiO2 (%)   Tympanic Monitor Sitting Right arm --      Pain Score       2           Vitals:    09/02/19 1601 09/02/19 1840   BP: 138/91 141/82   Pulse: 76 66   Patient Position - Orthostatic VS: Sitting Lying         Visual Acuity      ED Medications  Medications   cefTRIAXone (ROCEPHIN) IVPB (premix) 1,000 mg (0 mg Intravenous Stopped 9/2/19 1840)   sodium chloride 0 9 % bolus 1,000 mL (0 mL Intravenous Stopped 9/2/19 1840)       Diagnostic Studies  Results Reviewed     Procedure Component Value Units Date/Time    Urine Microscopic [[id number]]  (Abnormal) Collected:  09/02/19 1749    Lab Status:  Final result Specimen:  Urine Updated:  09/02/19 1818     RBC, UA 2-4 /hpf      WBC, UA 1-2 /hpf      Epithelial Cells Occasional /hpf      Bacteria, UA None Seen /hpf     UA (URINE) with reflex to Microscopic [[id number]] Collected:  09/02/19 1749    Lab Status: In process Specimen:  Urine Updated:  09/02/19 1801    Urine culture [[id number]] Collected:  09/02/19 1749    Lab Status:   In process Specimen:  Urine, Clean Catch Updated:  09/02/19 1754    Comprehensive metabolic panel [[id number]]  (Abnormal) Collected:  09/02/19 1628    Lab Status:  Final result Specimen:  Blood from Arm, Left Updated:  09/02/19 1651     Sodium 139 mmol/L      Potassium 3 3 mmol/L      Chloride 103 mmol/L      CO2 30 mmol/L      ANION GAP 6 mmol/L      BUN 15 mg/dL      Creatinine 1 03 mg/dL      Glucose 130 mg/dL      Calcium 8 8 mg/dL      AST 17 U/L      ALT 21 U/L      Alkaline Phosphatase 75 U/L      Total Protein 6 8 g/dL      Albumin 3 2 g/dL      Total Bilirubin 0 30 mg/dL      eGFR 79 ml/min/1 73sq m     Narrative:       [place] guidelines for Chronic Kidney Disease (CKD):     Stage 1 with normal or high GFR (GFR > 90 mL/min/1 73 square meters)    Stage 2 Mild CKD (GFR = 60-89 mL/min/1 73 square meters)    Stage 3A Moderate CKD (GFR = 45-59 mL/min/1 73 square meters)    Stage 3B Moderate CKD (GFR = 30-44 mL/min/1 73 square meters)    Stage 4 Severe CKD (GFR = 15-29 mL/min/1 73 square meters)    Stage 5 End Stage CKD (GFR <15 mL/min/1 73 square meters)  Note: GFR calculation is accurate only with a steady state creatinine    APTT [[id number]]  (Normal) Collected:  09/02/19 7621 Lab Status:  Final result Specimen:  Blood from Arm, Left Updated:  09/02/19 1648     PTT 28 seconds     Protime-INR [[id number]]  (Normal) Collected:  09/02/19 1628    Lab Status:  Final result Specimen:  Blood from Arm, Left Updated:  09/02/19 1648     Protime 10 5 seconds      INR 0 97    CBC and differential [[id number]]  (Abnormal) Collected:  09/02/19 1628    Lab Status:  Final result Specimen:  Blood from Arm, Left Updated:  09/02/19 1640     WBC 4 82 Thousand/uL      RBC 3 96 Million/uL      Hemoglobin 12 0 g/dL      Hematocrit 35 6 %      MCV 90 fL      MCH 30 3 pg      MCHC 33 7 g/dL      RDW 11 0 %      MPV 9 2 fL      Platelets 746 Thousands/uL      nRBC 0 /100 WBCs      Neutrophils Relative 57 %      Immat GRANS % 0 %      Lymphocytes Relative 38 %      Monocytes Relative 3 %      Eosinophils Relative 1 %      Basophils Relative 1 %      Neutrophils Absolute 2 81 Thousands/µL      Immature Grans Absolute 0 01 Thousand/uL      Lymphocytes Absolute 1 81 Thousands/µL      Monocytes Absolute 0 12 Thousand/µL      Eosinophils Absolute 0 04 Thousand/µL      Basophils Absolute 0 03 Thousands/µL                  CT renal stone study abdomen pelvis wo contrast   Final Result by April Serrano MD (09/02 1702)      Bladder wall thickening and perivesicular inflammatory change likely representing cystitis  Small focus of gas present within the bladder lumen               Workstation performed: [id number]                    Procedures  Procedures       ED Course                               MDM    Disposition  Final diagnoses:   Cystitis   Hematuria     Time reflects when diagnosis was documented in both MDM as applicable and the Disposition within this note     Time User Action Codes Description Comment    9/2/2019  7:10 PM Errol Love Add [N30 90] Cystitis     9/2/2019  7:10 PM Errol Love Add [R31 9] Hematuria       ED Disposition     ED Disposition Condition Date/Time Comment    Discharge Stable Mon Sep 2, 2019  7:10 PM Padmini Flores discharge to home/self care  Follow-up Information    None         Patient's Medications   Discharge Prescriptions    CEPHALEXIN (KEFLEX) 500 MG CAPSULE    Take 1 capsule (500 mg total) by mouth 2 (two) times a day for 10 doses       Start Date: 9/2/2019  End Date: 9/7/2019       Order Dose: 500 mg       Quantity: 10 capsule    Refills: 0     No discharge procedures on file      ED Provider  Electronically Signed by           Corinne Villar MD  09/02/19 5539

## 2019-09-03 LAB — BACTERIA UR CULT: NORMAL

## 2019-11-27 ENCOUNTER — TELEPHONE (OUTPATIENT)
Dept: GASTROENTEROLOGY | Facility: AMBULARY SURGERY CENTER | Age: 59
End: 2019-11-27

## 2019-11-27 NOTE — TELEPHONE ENCOUNTER
11/27/19  Screened by: Getachew Stokes    Referring Provider     Pre- Screening: There is no height or weight on file to calculate BMI  Has patient been referred for a routine screening Colonoscopy? yes  Is the patient between 39-70 years old? yes    SCHEDULING STAFF   If the patient is between 45yrs-49yrs, please advise patient to confirm benefits/coverage with their insurance company for a routine screening colonoscopy, some insurance carriers will only cover at [phone] or older   If the patient is over 66years old, please schedule an office visit  Do you have any of the following symptoms? NO      Have you had a coronary or vascular stent within the last year? no    Have you had a heart attack or stroke in the last 6 months? no    Have you had intestinal surgery in the last 3 months? no    Do you have problems with: Sleep Apnea    Do you use: CPAP/BiPAP    Have you been hospitalized in the last Month? no    Have you been diagnosed with a bleeding disorder or anemia? no    Have you had chest pain (angina) or breathing problems  (COPD) in the last 3 months? no    Do you have any difficulty walking up a flight of stairs? no    Have you had Kidney failure or insufficiency? no    Have you had heart valve surgery? no    Are you confined to a wheelchair? no    Do you take Other blood thinning medications no    Have you been diagnosed with Diabetes or are you taking any   Diabetic medications? no    : If patient answers NO to medical questions, then schedule procedure  If patient answers YES to medical questions, then schedule office appointment  Previous Colonoscopy no  Date and Facility of last colonoscopy?      Comments: PASSED OA

## 2019-11-27 NOTE — TELEPHONE ENCOUNTER
He is having a surgical procedure 12-17-19 and would like colon done prior if possible    Call back # [phone]

## 2020-10-19 NOTE — TELEPHONE ENCOUNTER
Patient called to cancel appointment with Dr Murphy Jaffe, he did not want to r/s this appt  I advised him to inform the referring doctor, Dr Trilby Heimlich  Pt notified of message per Dr. Corby Denney and voiced understanding of what was read to her.

## 2025-04-10 ENCOUNTER — HOSPITAL ENCOUNTER (OUTPATIENT)
Dept: RADIOLOGY | Facility: HOSPITAL | Age: 65
Discharge: HOME | End: 2025-04-10
Payer: COMMERCIAL

## 2025-04-10 DIAGNOSIS — R05.9 COUGH, UNSPECIFIED TYPE: ICD-10-CM

## 2025-04-10 DIAGNOSIS — R10.813 RIGHT LOWER QUADRANT ABDOMINAL TENDERNESS, REBOUND TENDERNESS PRESENCE NOT SPECIFIED: ICD-10-CM

## 2025-04-10 PROCEDURE — 76705 ECHO EXAM OF ABDOMEN: CPT

## 2025-04-10 PROCEDURE — 71046 X-RAY EXAM CHEST 2 VIEWS: CPT

## 2025-04-10 PROCEDURE — 76870 US EXAM SCROTUM: CPT

## 2025-05-06 ENCOUNTER — PREP FOR PROCEDURE (OUTPATIENT)
Dept: SURGERY | Facility: CLINIC | Age: 65
End: 2025-05-06

## 2025-05-06 ENCOUNTER — OFFICE VISIT (OUTPATIENT)
Dept: SURGERY | Facility: CLINIC | Age: 65
End: 2025-05-06
Payer: COMMERCIAL

## 2025-05-06 VITALS
SYSTOLIC BLOOD PRESSURE: 107 MMHG | WEIGHT: 170 LBS | DIASTOLIC BLOOD PRESSURE: 64 MMHG | OXYGEN SATURATION: 97 % | HEIGHT: 72 IN | BODY MASS INDEX: 23.03 KG/M2 | HEART RATE: 78 BPM

## 2025-05-06 DIAGNOSIS — K40.90 INGUINAL HERNIA: Primary | ICD-10-CM

## 2025-05-06 DIAGNOSIS — K40.90 NON-RECURRENT UNILATERAL INGUINAL HERNIA WITHOUT OBSTRUCTION OR GANGRENE: Primary | ICD-10-CM

## 2025-05-06 DIAGNOSIS — C67.9 BLADDER CANCER (HCC): ICD-10-CM

## 2025-05-06 DIAGNOSIS — C61 PROSTATE CANCER (HCC): ICD-10-CM

## 2025-05-06 PROCEDURE — 99244 OFF/OP CNSLTJ NEW/EST MOD 40: CPT | Performed by: SURGERY

## 2025-05-06 NOTE — PROGRESS NOTES
Name: Quique Brooks      : 1960      MRN: [id number]  Encounter Provider: Quique Vega MD  Encounter Date: 2025   Encounter department: [facility] GENERAL SURGERY [facility]  :  Assessment & Plan  Non-recurrent unilateral inguinal hernia without obstruction or gangrene         Bladder cancer (HCC)         Prostate cancer (HCC)             History of Present Illness   HPI  Quique Brooks is a 64 y.o. male who presents for right inguinal hernia consult  States he has had intermittent dull achy pain RLQ for one month.  He is unsure if he has a bulge.  Limits his activities.    Examination reveals a right inguinal hernia to be present.  No evidence for left inguinal herniation.  Midline lower abdominal incision present.  Is notable that he has undergone prostatectomy in neobladder reconstruction in 2019 at [facility].  Notations in chart reviewed.      Ultrasound groin/inguinal area 4/10/2025 imaging was reviewed.  FINDINGS:  There is a right inguinal hernia containing at least fat. Small segment of bowel involvement is not excluded. Left inguinal region appears unremarkable  IMPRESSION:  Right inguinal hernia containing at least. An attempt will be made to obtain an outside CT examination from 2025. Once these images are obtained, an addendum can be issued.  ADDENDUM:  Outside CT images from 2025 are now available for comparison. The available CT images appear discontinuous with visualization of the mid inguinal canals only. No images of the proximal inguinal canals are available on the provided images.   Nonetheless, if there is an inguinal hernia, it would only be a very small one. If symptoms persist, dedicated noncontrast CT of the pelvis may be considered.    I recommended a Maye approach to a right inguinal hernia repair to stay out of the peritoneum.  He is agreeable.  Risks and benefits of surgery discussed.  Medications reviewed.  Postop pain medication  ordered.  Preadmission testing laboratory studies ordered.                     Review of Systems   Constitutional: Negative.  Negative for activity change.   HENT: Negative.     Eyes: Negative.    Respiratory: Negative.     Cardiovascular: Negative.    Gastrointestinal:  Positive for abdominal pain.   Endocrine: Negative.    Genitourinary: Negative.    Musculoskeletal: Negative.    Skin: Negative.    Allergic/Immunologic: Negative.    Neurological: Negative.    Psychiatric/Behavioral:  Negative for agitation, behavioral problems and confusion. The patient is not nervous/anxious.           Objective   /64   Pulse 78   Ht 6' (1.829 m)   Wt 77.1 kg (170 lb)   SpO2 97%   BMI 23.06 kg/m²      Physical Exam  Vitals and nursing note reviewed.   Constitutional:       General: He is not in acute distress.     Appearance: He is well-developed.   HENT:      Head: Normocephalic and atraumatic.   Cardiovascular:      Rate and Rhythm: Normal rate and regular rhythm.   Pulmonary:      Effort: Pulmonary effort is normal.      Breath sounds: Normal breath sounds.   Abdominal:      Palpations: Abdomen is soft.      Tenderness: There is no abdominal tenderness.      Hernia: A hernia is present.   Musculoskeletal:         General: No swelling.      Cervical back: Neck supple.   Skin:     General: Skin is warm and dry.   Neurological:      Mental Status: He is alert.   Psychiatric:         Mood and Affect: Mood normal.

## 2025-05-06 NOTE — H&P (VIEW-ONLY)
Name: Quique Brooks      : 1960      MRN: [id number]  Encounter Provider: Quique Vega MD  Encounter Date: 2025   Encounter department: [facility] GENERAL SURGERY [facility]  :  Assessment & Plan  Non-recurrent unilateral inguinal hernia without obstruction or gangrene         Bladder cancer (HCC)         Prostate cancer (HCC)             History of Present Illness   HPI  Quique Brooks is a 64 y.o. male who presents for right inguinal hernia consult  States he has had intermittent dull achy pain RLQ for one month.  He is unsure if he has a bulge.  Limits his activities.    Examination reveals a right inguinal hernia to be present.  No evidence for left inguinal herniation.  Midline lower abdominal incision present.  Is notable that he has undergone prostatectomy in neobladder reconstruction in 2019 at [facility].  Notations in chart reviewed.      Ultrasound groin/inguinal area 4/10/2025 imaging was reviewed.  FINDINGS:  There is a right inguinal hernia containing at least fat. Small segment of bowel involvement is not excluded. Left inguinal region appears unremarkable  IMPRESSION:  Right inguinal hernia containing at least. An attempt will be made to obtain an outside CT examination from 2025. Once these images are obtained, an addendum can be issued.  ADDENDUM:  Outside CT images from 2025 are now available for comparison. The available CT images appear discontinuous with visualization of the mid inguinal canals only. No images of the proximal inguinal canals are available on the provided images.   Nonetheless, if there is an inguinal hernia, it would only be a very small one. If symptoms persist, dedicated noncontrast CT of the pelvis may be considered.    I recommended a Maye approach to a right inguinal hernia repair to stay out of the peritoneum.  He is agreeable.  Risks and benefits of surgery discussed.  Medications reviewed.  Postop pain medication  ordered.  Preadmission testing laboratory studies ordered.                     Review of Systems   Constitutional: Negative.  Negative for activity change.   HENT: Negative.     Eyes: Negative.    Respiratory: Negative.     Cardiovascular: Negative.    Gastrointestinal:  Positive for abdominal pain.   Endocrine: Negative.    Genitourinary: Negative.    Musculoskeletal: Negative.    Skin: Negative.    Allergic/Immunologic: Negative.    Neurological: Negative.    Psychiatric/Behavioral:  Negative for agitation, behavioral problems and confusion. The patient is not nervous/anxious.           Objective   /64   Pulse 78   Ht 6' (1.829 m)   Wt 77.1 kg (170 lb)   SpO2 97%   BMI 23.06 kg/m²      Physical Exam  Vitals and nursing note reviewed.   Constitutional:       General: He is not in acute distress.     Appearance: He is well-developed.   HENT:      Head: Normocephalic and atraumatic.   Cardiovascular:      Rate and Rhythm: Normal rate and regular rhythm.   Pulmonary:      Effort: Pulmonary effort is normal.      Breath sounds: Normal breath sounds.   Abdominal:      Palpations: Abdomen is soft.      Tenderness: There is no abdominal tenderness.      Hernia: A hernia is present.   Musculoskeletal:         General: No swelling.      Cervical back: Neck supple.   Skin:     General: Skin is warm and dry.   Neurological:      Mental Status: He is alert.   Psychiatric:         Mood and Affect: Mood normal.

## 2025-05-14 ENCOUNTER — APPOINTMENT (OUTPATIENT)
Dept: LAB | Facility: CLINIC | Age: 65
End: 2025-05-14
Attending: SURGERY
Payer: COMMERCIAL

## 2025-05-14 ENCOUNTER — ANESTHESIA EVENT (OUTPATIENT)
Dept: PERIOP | Facility: AMBULARY SURGERY CENTER | Age: 65
End: 2025-05-14
Payer: COMMERCIAL

## 2025-05-14 DIAGNOSIS — K40.90 INGUINAL HERNIA: ICD-10-CM

## 2025-05-14 LAB
ALBUMIN SERPL BCG-MCNC: 4.7 G/DL (ref 3.5–5)
ALP SERPL-CCNC: 51 U/L (ref 34–104)
ALT SERPL W P-5'-P-CCNC: 28 U/L (ref 7–52)
ANION GAP SERPL CALCULATED.3IONS-SCNC: 7 MMOL/L (ref 4–13)
AST SERPL W P-5'-P-CCNC: 24 U/L (ref 13–39)
BILIRUB SERPL-MCNC: 0.44 MG/DL (ref 0.2–1)
BUN SERPL-MCNC: 23 MG/DL (ref 5–25)
CALCIUM SERPL-MCNC: 9.4 MG/DL (ref 8.4–10.2)
CHLORIDE SERPL-SCNC: 105 MMOL/L (ref 96–108)
CO2 SERPL-SCNC: 28 MMOL/L (ref 21–32)
CREAT SERPL-MCNC: 0.92 MG/DL (ref 0.6–1.3)
ERYTHROCYTE [DISTWIDTH] IN BLOOD BY AUTOMATED COUNT: 12.9 % (ref 11.6–15.1)
GFR SERPL CREATININE-BSD FRML MDRD: 86 ML/MIN/1.73SQ M
GLUCOSE SERPL-MCNC: 84 MG/DL (ref 65–140)
HCT VFR BLD AUTO: 49.5 % (ref 36.5–49.3)
HGB BLD-MCNC: 16.7 G/DL (ref 12–17)
MCH RBC QN AUTO: 30.1 PG (ref 26.8–34.3)
MCHC RBC AUTO-ENTMCNC: 33.7 G/DL (ref 31.4–37.4)
MCV RBC AUTO: 89 FL (ref 82–98)
PLATELET # BLD AUTO: 232 THOUSANDS/UL (ref 149–390)
PMV BLD AUTO: 11.1 FL (ref 8.9–12.7)
POTASSIUM SERPL-SCNC: 3.8 MMOL/L (ref 3.5–5.3)
PROT SERPL-MCNC: 7.2 G/DL (ref 6.4–8.4)
RBC # BLD AUTO: 5.55 MILLION/UL (ref 3.88–5.62)
SODIUM SERPL-SCNC: 140 MMOL/L (ref 135–147)
WBC # BLD AUTO: 7.7 THOUSAND/UL (ref 4.31–10.16)

## 2025-05-14 PROCEDURE — 80053 COMPREHEN METABOLIC PANEL: CPT

## 2025-05-14 PROCEDURE — 85027 COMPLETE CBC AUTOMATED: CPT

## 2025-05-14 PROCEDURE — 36415 COLL VENOUS BLD VENIPUNCTURE: CPT

## 2025-05-15 LAB
ATRIAL RATE: 54 BPM
P AXIS: 69 DEGREES
PR INTERVAL: 154 MS
QRS AXIS: 64 DEGREES
QRSD INTERVAL: 92 MS
QT INTERVAL: 428 MS
QTC INTERVAL: 406 MS
T WAVE AXIS: 39 DEGREES
VENTRICULAR RATE: 54 BPM

## 2025-05-15 PROCEDURE — 93010 ELECTROCARDIOGRAM REPORT: CPT | Performed by: INTERNAL MEDICINE

## 2025-05-16 NOTE — PRE-PROCEDURE INSTRUCTIONS
Pre-Surgery Instructions:   Medication Instructions    Multiple Vitamin (MULTIVITAMIN) tablet Stop taking 7 days prior to surgery.  5/15/25        Medication instructions for day of surgery reviewed. Please take all instructed medications with only a sip of water.       You will receive a call one business day prior to surgery with an arrival time and hospital directions. If your surgery is scheduled on a Monday, the hospital will be calling you on the Friday prior to your surgery. If you have not heard from anyone by 8pm, please call the hospital supervisor through the hospital  at [phone]. ([place] [phone] or [place] [phone]).    Do not eat or drink anything after midnight the night before your surgery, including candy, mints, lifesavers, or chewing gum. Do not drink alcohol 24hrs before your surgery. Try not to smoke at least 24hrs before your surgery.       Follow the pre surgery showering instructions as listed in the “My Surgical Experience Booklet” or otherwise provided by your surgeon's office. Do not use a blade to shave the surgical area 1 week before surgery. It is okay to use a clean electric clippers up to 24 hours before surgery. Do not apply any lotions, creams, including makeup, cologne, deodorant, or perfumes after showering on the day of your surgery. Do not use dry shampoo, hair spray, hair gel, or any type of hair products.     No contact lenses, eye make-up, or artificial eyelashes. Remove nail polish, including gel polish, and any artificial, gel, or acrylic nails if possible. Remove all jewelry including rings and body piercing jewelry.     Wear causal clothing that is easy to take on and off. Consider your type of surgery.    Keep any valuables, jewelry, piercings at home. Please bring any specially ordered equipment (sling, braces) if indicated.    Arrange for a responsible person to drive you to and from the hospital on the day of your surgery. Please confirm the  visitor policy for the day of your procedure when you receive your phone call with an arrival time.     Call the surgeon's office with any new illnesses, exposures, or additional questions prior to surgery.    Please reference your “My Surgical Experience Booklet” for additional information to prepare for your upcoming surgery.

## 2025-05-27 RX ORDER — OXYCODONE AND ACETAMINOPHEN 5; 325 MG/1; MG/1
1 TABLET ORAL EVERY 4 HOURS PRN
Qty: 10 TABLET | Refills: 0 | Status: SHIPPED | OUTPATIENT
Start: 2025-05-27

## 2025-05-27 NOTE — DISCHARGE INSTR - AVS FIRST PAGE
Please call the office when you leave to schedule an appointment for 2 weeks.              Please call [phone]. [address], [address], [place], [address]. [address] between [employer] and [employer] [employer].     Activity:    May lift 10 lb as many times as desired the 1st week,       20 lb in 2 weeks,       30 lb in 3 weeks.                Walking is encouraged  Normal daily activities including climbing steps are okay  Do not engage in strenuous activity ( sit-ups or crutches) or contact sports for 4-6 weeks post-operatively    Return to Work:   Okay to return to work when you feel well if you desire.        Diet:   You may return to your normal healthy diet.    Wound Care:  Your wound is closed with dissolvable stitches and glue.  It is okay to shower. Wash incision gently with soap and water and pat dry. Do not soak incisions in bath water or swim for two weeks. Do not apply any creams or ointments.    Pain Medication:   Please take as directed if needed. May use Advil or Motrin in addition.  Recall, the pain medicine and anesthesia is associated with constipation.    No driving while taking narcotic pain medications.      Other:  It is normal to developed a “healing ridge” / firm incision after surgery.  This is your body making scar tissue.  It is a good sign  Constipation is very common after general anesthesia.  Please use milk of magnesia as needed in order to help prevent constipation.  It is normal to get bruising after surgery.  If you have questions after discharge please call the office.    If you have increased pain, fever >101.5, increased drainage, redness or a bad smell at your surgery site, please call us immediately or come directly to the Emergency Room

## 2025-05-28 ENCOUNTER — HOSPITAL ENCOUNTER (OUTPATIENT)
Facility: AMBULARY SURGERY CENTER | Age: 65
Setting detail: OUTPATIENT SURGERY
Discharge: HOME | End: 2025-05-28
Attending: SURGERY | Admitting: SURGERY
Payer: COMMERCIAL

## 2025-05-28 ENCOUNTER — ANESTHESIA (OUTPATIENT)
Dept: PERIOP | Facility: AMBULARY SURGERY CENTER | Age: 65
End: 2025-05-28
Payer: COMMERCIAL

## 2025-05-28 VITALS
OXYGEN SATURATION: 97 % | DIASTOLIC BLOOD PRESSURE: 61 MMHG | HEIGHT: 72 IN | RESPIRATION RATE: 18 BRPM | SYSTOLIC BLOOD PRESSURE: 117 MMHG | TEMPERATURE: 97.5 F | HEART RATE: 75 BPM | WEIGHT: 174 LBS | BODY MASS INDEX: 23.57 KG/M2

## 2025-05-28 DIAGNOSIS — K40.90 NON-RECURRENT UNILATERAL INGUINAL HERNIA WITHOUT OBSTRUCTION OR GANGRENE: Primary | ICD-10-CM

## 2025-05-28 PROCEDURE — 49505 PRP I/HERN INIT REDUC >5 YR: CPT | Performed by: SURGERY

## 2025-05-28 PROCEDURE — C1781 MESH (IMPLANTABLE): HCPCS | Performed by: SURGERY

## 2025-05-28 DEVICE — MODIFIED ONFLEX MESH, 3.4" X 5.6" (8.6 CM X 14.2 CM), MEDIUM
Type: IMPLANTABLE DEVICE | Site: INGUINAL | Status: FUNCTIONAL
Brand: ONFLEX

## 2025-05-28 RX ORDER — KETOROLAC TROMETHAMINE 30 MG/ML
INJECTION, SOLUTION INTRAMUSCULAR; INTRAVENOUS AS NEEDED
Status: DISCONTINUED | OUTPATIENT
Start: 2025-05-28 | End: 2025-05-28

## 2025-05-28 RX ORDER — CEFAZOLIN SODIUM 2 G/50ML
2000 SOLUTION INTRAVENOUS ONCE
Status: COMPLETED | OUTPATIENT
Start: 2025-05-28 | End: 2025-05-28

## 2025-05-28 RX ORDER — PROPOFOL 10 MG/ML
INJECTION, EMULSION INTRAVENOUS AS NEEDED
Status: DISCONTINUED | OUTPATIENT
Start: 2025-05-28 | End: 2025-05-28

## 2025-05-28 RX ORDER — PHENYLEPHRINE HCL IN 0.9% NACL 1 MG/10 ML
SYRINGE (ML) INTRAVENOUS AS NEEDED
Status: DISCONTINUED | OUTPATIENT
Start: 2025-05-28 | End: 2025-05-28

## 2025-05-28 RX ORDER — ONDANSETRON 2 MG/ML
4 INJECTION INTRAMUSCULAR; INTRAVENOUS ONCE AS NEEDED
Status: DISCONTINUED | OUTPATIENT
Start: 2025-05-28 | End: 2025-05-28 | Stop reason: HOSPADM

## 2025-05-28 RX ORDER — ACETAMINOPHEN 325 MG/1
650 TABLET ORAL EVERY 4 HOURS PRN
Status: DISCONTINUED | OUTPATIENT
Start: 2025-05-28 | End: 2025-05-28 | Stop reason: HOSPADM

## 2025-05-28 RX ORDER — METOCLOPRAMIDE HYDROCHLORIDE 5 MG/ML
10 INJECTION INTRAMUSCULAR; INTRAVENOUS ONCE AS NEEDED
Status: DISCONTINUED | OUTPATIENT
Start: 2025-05-28 | End: 2025-05-28 | Stop reason: HOSPADM

## 2025-05-28 RX ORDER — ACETAMINOPHEN 10 MG/ML
INJECTION, SOLUTION INTRAVENOUS AS NEEDED
Status: DISCONTINUED | OUTPATIENT
Start: 2025-05-28 | End: 2025-05-28

## 2025-05-28 RX ORDER — HYDROMORPHONE HCL/PF 1 MG/ML
0.5 SYRINGE (ML) INJECTION
Refills: 0 | Status: DISCONTINUED | OUTPATIENT
Start: 2025-05-28 | End: 2025-05-28 | Stop reason: HOSPADM

## 2025-05-28 RX ORDER — SODIUM CHLORIDE, SODIUM LACTATE, POTASSIUM CHLORIDE, CALCIUM CHLORIDE 600; 310; 30; 20 MG/100ML; MG/100ML; MG/100ML; MG/100ML
INJECTION, SOLUTION INTRAVENOUS CONTINUOUS PRN
Status: DISCONTINUED | OUTPATIENT
Start: 2025-05-28 | End: 2025-05-28

## 2025-05-28 RX ORDER — MAGNESIUM HYDROXIDE 1200 MG/15ML
LIQUID ORAL AS NEEDED
Status: DISCONTINUED | OUTPATIENT
Start: 2025-05-28 | End: 2025-05-28 | Stop reason: HOSPADM

## 2025-05-28 RX ORDER — FENTANYL CITRATE 50 UG/ML
INJECTION, SOLUTION INTRAMUSCULAR; INTRAVENOUS AS NEEDED
Status: DISCONTINUED | OUTPATIENT
Start: 2025-05-28 | End: 2025-05-28

## 2025-05-28 RX ORDER — FUROSEMIDE 10 MG/ML
INJECTION INTRAMUSCULAR; INTRAVENOUS AS NEEDED
Status: DISCONTINUED | OUTPATIENT
Start: 2025-05-28 | End: 2025-05-28

## 2025-05-28 RX ORDER — OXYCODONE AND ACETAMINOPHEN 5; 325 MG/1; MG/1
1 TABLET ORAL EVERY 4 HOURS PRN
Refills: 0 | Status: DISCONTINUED | OUTPATIENT
Start: 2025-05-28 | End: 2025-05-28 | Stop reason: HOSPADM

## 2025-05-28 RX ORDER — EPHEDRINE SULFATE 50 MG/ML
INJECTION INTRAVENOUS AS NEEDED
Status: DISCONTINUED | OUTPATIENT
Start: 2025-05-28 | End: 2025-05-28

## 2025-05-28 RX ORDER — MIDAZOLAM HYDROCHLORIDE 2 MG/2ML
INJECTION, SOLUTION INTRAMUSCULAR; INTRAVENOUS AS NEEDED
Status: DISCONTINUED | OUTPATIENT
Start: 2025-05-28 | End: 2025-05-28

## 2025-05-28 RX ORDER — DEXAMETHASONE SODIUM PHOSPHATE 10 MG/ML
INJECTION, SOLUTION INTRAMUSCULAR; INTRAVENOUS AS NEEDED
Status: DISCONTINUED | OUTPATIENT
Start: 2025-05-28 | End: 2025-05-28

## 2025-05-28 RX ORDER — LIDOCAINE HYDROCHLORIDE 10 MG/ML
INJECTION, SOLUTION EPIDURAL; INFILTRATION; INTRACAUDAL; PERINEURAL AS NEEDED
Status: DISCONTINUED | OUTPATIENT
Start: 2025-05-28 | End: 2025-05-28

## 2025-05-28 RX ORDER — ONDANSETRON 2 MG/ML
INJECTION INTRAMUSCULAR; INTRAVENOUS AS NEEDED
Status: DISCONTINUED | OUTPATIENT
Start: 2025-05-28 | End: 2025-05-28

## 2025-05-28 RX ORDER — BUPIVACAINE HYDROCHLORIDE AND EPINEPHRINE 2.5; 5 MG/ML; UG/ML
INJECTION, SOLUTION EPIDURAL; INFILTRATION; INTRACAUDAL; PERINEURAL AS NEEDED
Status: DISCONTINUED | OUTPATIENT
Start: 2025-05-28 | End: 2025-05-28 | Stop reason: HOSPADM

## 2025-05-28 RX ORDER — ONDANSETRON 2 MG/ML
4 INJECTION INTRAMUSCULAR; INTRAVENOUS EVERY 4 HOURS PRN
Status: DISCONTINUED | OUTPATIENT
Start: 2025-05-28 | End: 2025-05-28 | Stop reason: HOSPADM

## 2025-05-28 RX ORDER — FENTANYL CITRATE/PF 50 MCG/ML
50 SYRINGE (ML) INJECTION
Status: DISCONTINUED | OUTPATIENT
Start: 2025-05-28 | End: 2025-05-28 | Stop reason: HOSPADM

## 2025-05-28 RX ADMIN — SODIUM CHLORIDE, SODIUM LACTATE, POTASSIUM CHLORIDE, AND CALCIUM CHLORIDE: .6; .31; .03; .02 INJECTION, SOLUTION INTRAVENOUS at 08:38

## 2025-05-28 RX ADMIN — ACETAMINOPHEN 1000 MG: 10 INJECTION INTRAVENOUS at 07:32

## 2025-05-28 RX ADMIN — LIDOCAINE HYDROCHLORIDE 50 MG: 10 INJECTION, SOLUTION EPIDURAL; INFILTRATION; INTRACAUDAL at 07:26

## 2025-05-28 RX ADMIN — ONDANSETRON 4 MG: 2 INJECTION INTRAMUSCULAR; INTRAVENOUS at 07:22

## 2025-05-28 RX ADMIN — Medication 100 MCG: at 08:29

## 2025-05-28 RX ADMIN — FENTANYL CITRATE 25 MCG: 50 INJECTION INTRAMUSCULAR; INTRAVENOUS at 07:37

## 2025-05-28 RX ADMIN — DEXAMETHASONE SODIUM PHOSPHATE 10 MG: 10 INJECTION INTRAMUSCULAR; INTRAVENOUS at 07:32

## 2025-05-28 RX ADMIN — CEFAZOLIN SODIUM 2000 MG: 2 SOLUTION INTRAVENOUS at 07:23

## 2025-05-28 RX ADMIN — EPHEDRINE SULFATE 10 MG: 50 INJECTION INTRAVENOUS at 07:41

## 2025-05-28 RX ADMIN — Medication 100 MCG: at 08:41

## 2025-05-28 RX ADMIN — FENTANYL CITRATE 25 MCG: 50 INJECTION INTRAMUSCULAR; INTRAVENOUS at 08:41

## 2025-05-28 RX ADMIN — FENTANYL CITRATE 25 MCG: 50 INJECTION INTRAMUSCULAR; INTRAVENOUS at 07:50

## 2025-05-28 RX ADMIN — MIDAZOLAM 2 MG: 1 INJECTION INTRAMUSCULAR; INTRAVENOUS at 07:22

## 2025-05-28 RX ADMIN — SODIUM CHLORIDE, SODIUM LACTATE, POTASSIUM CHLORIDE, AND CALCIUM CHLORIDE: .6; .31; .03; .02 INJECTION, SOLUTION INTRAVENOUS at 07:02

## 2025-05-28 RX ADMIN — KETOROLAC TROMETHAMINE 15 MG: 30 INJECTION, SOLUTION INTRAMUSCULAR; INTRAVENOUS at 07:52

## 2025-05-28 RX ADMIN — FUROSEMIDE 10 MG: 10 INJECTION, SOLUTION INTRAMUSCULAR; INTRAVENOUS at 08:37

## 2025-05-28 RX ADMIN — Medication 200 MCG: at 07:31

## 2025-05-28 RX ADMIN — Medication 100 MCG: at 07:56

## 2025-05-28 RX ADMIN — PROPOFOL 200 MG: 10 INJECTION, EMULSION INTRAVENOUS at 07:26

## 2025-05-28 RX ADMIN — FENTANYL CITRATE 25 MCG: 50 INJECTION INTRAMUSCULAR; INTRAVENOUS at 08:05

## 2025-05-28 NOTE — INTERVAL H&P NOTE
H&P reviewed. After examining the patient I find no changes in the patients condition since the H&P had been written.    Vitals:    05/28/25 0645   BP: 125/78   Pulse: 63   Resp: 16   Temp: (!) 96.9 °F (36.1 °C)   SpO2: 98%

## 2025-05-28 NOTE — ANESTHESIA POSTPROCEDURE EVALUATION
Post-Op Assessment Note    CV Status:  Stable  Pain Score: 0    Pain management: adequate       Mental Status:  Sleepy   Hydration Status:  Euvolemic   PONV Controlled:  Controlled   Airway Patency:  Patent     Post Op Vitals Reviewed: Yes    No anethesia notable event occurred.    Staff: CRNA           Last Filed PACU Vitals:  Vitals Value Taken Time   Temp 98.4 °F (36.9 °C) 05/28/25 08:54   Pulse 79 05/28/25 08:54   /59 05/28/25 08:54   Resp 18 05/28/25 08:54   SpO2 98 % 05/28/25 08:54   Vitals shown include unfiled device data.

## 2025-05-28 NOTE — OP NOTE
OPERATIVE REPORT  PATIENT NAME: Quique Brooks    :  1960  MRN: [id number]  Pt Location: [facility] OR ROOM 04    SURGERY DATE: 2025    Surgeons and Role:     * Quique Vega MD - Primary     * Kenny Nice MD - Assisting    Preop Diagnosis:  Inguinal hernia [K40.90]    Post-Op Diagnosis Codes:     * Inguinal hernia [K40.90]    Procedure(s):  Right - REPAIR HERNIA INGUINAL    Specimen(s):  * No specimens in log *    Estimated Blood Loss:   Minimal    Drains:  * No LDAs found *    Anesthesia Type:   General    Operative Indications:  Inguinal hernia [K40.90]      Operative Findings:         Complications:   None    Procedure and Technique:  Quique Brooks is a 64 y.o. male was brought into the operative suite and identified visually and by arm band. The patient was placed in the supine position.  Careful attention was made towards padding and positioning of the extremities.  After sterile prep and drape, a timeout was completed. The prep was dry.  Antibiotics were provided. Athrombic pumps in place.    0.25% Marcaine with epinephrine was utilized throughout the procedure.  An incision was made  within the right inguinal region.  The incision was carried down through the skin and subcutaneous tissue. The superficial epigastric vein was clamped, ligated and  divided. . The external oblique fibers were identified.  The external ring was identified.  The external oblique fibers were then split in the direction of their fibers.  Hemostats were placed on the cut edges.  A self-retaining retractor was then placed.    Exploration of the inguinal canal commenced.  The cremasteric fibers were then divided along the fiber direction of its fibers the cord structures.   The cord was encircled in a atraumatic Penrose drain and preserved during dissection.  Identification of a indirect inguinal hernia was performed. High dissection was completed at the level of the internal ring.  Preperitoneal dissection commenced  identifying and preserving the inferior epigastric vessels.    This was more difficult secondary to ablations history for bladder cancer as well as bladder instillation of chemotherapy.  Adhesions were present.  A preperitoneal mesh was not  inserted.  The branch of the genitofemoral nerve was divided in order to help prevent postoperative neuralgia.  The inguinal floor was then repaired with interrupted figure-of-eight 0 Vicryl suture.  The indirect inguinal ring was repositioned more superiorly while repairing the inguinal transversalis muscular floor.  An onlay mesh was then secured to the tendon overlying the lateral pubic tubercle The external oblique fascia was closed with a running 3-0 PDS suture.  Additional 0.25% Marcaine with epinephrine was injected over the ilioinguinal and iliohypogastric nerves.    3-0 Vicryl subcutaneous suture was placed into the Justin's fascia .   4-0 Monocryl suture was used for the subcuticular layer. Dermabond was then applied.    The patient was then awakened from general anesthesia and transferred to the recovery room in stable condition. Sponge and instrument count correct ×2.     I was present for the entire procedure.    Patient Disposition:  PACU          SIGNATURE: Quique Vega MD  DATE: May 28, 2025  TIME: 10:30 AM

## 2025-05-28 NOTE — ANESTHESIA PREPROCEDURE EVALUATION
Procedure:  REPAIR HERNIA INGUINAL (Right: Groin)    Relevant Problems   /RENAL   (+) Prostate cancer (HCC)        Physical Exam    Airway     Mallampati score: II  TM Distance: >3 FB  Neck ROM: full      Cardiovascular      Dental       Pulmonary      Neurological      Other Findings        Anesthesia Plan  ASA Score- 2     Anesthesia Type- general with ASA Monitors.         Additional Monitors:     Airway Plan: LMA and LMA.           Plan Factors-    Chart reviewed.                      Induction-     Postoperative Plan-         Informed Consent- Anesthetic plan and risks discussed with patient.  I personally reviewed this patient with the CRNA. Discussed and agreed on the Anesthesia Plan with the CRNA..      NPO Status:  Vitals Value Taken Time   Date of last liquid 05/27/25 05/28/25 06:42   Time of last liquid 2100 05/28/25 06:42   Date of last solid 05/27/25 05/28/25 06:42   Time of last solid 2100 05/28/25 06:42

## 2025-06-03 NOTE — ANESTHESIA POSTPROCEDURE EVALUATION
Post-Op Assessment Note    CV Status:  Stable  Pain Score: 0    Pain management: adequate       Mental Status:  Sleepy   Hydration Status:  Euvolemic   PONV Controlled:  Controlled   Airway Patency:  Patent     Post Op Vitals Reviewed: Yes    No anethesia notable event occurred.    Staff: CRNA, Anesthesiologist           Last Filed PACU Vitals:  Vitals Value Taken Time   Temp 97.4 °F (36.3 °C) 05/28/25 09:09   Pulse 83 05/28/25 09:09   /65 05/28/25 09:09   Resp 21 05/28/25 09:09   SpO2 97 % 05/28/25 09:09       Modified Alverto:     Vitals Value Taken Time   Activity 2 05/28/25 09:09   Respiration 2 05/28/25 09:09   Circulation 2 05/28/25 09:09   Consciousness 2 05/28/25 09:09   Oxygen Saturation 2 05/28/25 09:09     Modified Alverto Score: 10

## 2025-06-24 ENCOUNTER — OFFICE VISIT (OUTPATIENT)
Dept: SURGERY | Facility: CLINIC | Age: 65
End: 2025-06-24

## 2025-06-24 DIAGNOSIS — K40.90 NON-RECURRENT UNILATERAL INGUINAL HERNIA WITHOUT OBSTRUCTION OR GANGRENE: Primary | ICD-10-CM

## 2025-06-24 PROCEDURE — 99024 POSTOP FOLLOW-UP VISIT: CPT | Performed by: SURGERY

## 2025-06-24 RX ORDER — LIDOCAINE 50 MG/G
1 PATCH TOPICAL DAILY
Qty: 14 PATCH | Refills: 1 | Status: SHIPPED | OUTPATIENT
Start: 2025-06-24

## 2025-06-24 NOTE — PROGRESS NOTES
Name: Quique Brooks      : 1960      MRN: [id number]  Encounter Provider: Quique Vega MD  Encounter Date: 2025   Encounter department: [facility] [facility]  :  Assessment & Plan  Non-recurrent unilateral inguinal hernia without obstruction or gangrene    Orders:    lidocaine (Lidoderm) 5 %; Apply 1 patch topically daily over 12 hours Remove & Discard patch within 12 hours or as directed by MD        History of Present Illness   HPI  Quique Brooks is a 64 y.o. male who presents post operatively.  Right inguinal hernia repair 2025.    He returns overall feeling well, but he still has soreness.  He complains of a sandpaperlike feeling in the upper inner aspect of the thigh.  It is worse with sitting.  It is not aggravated with lifting.  He has no pain at night.    Examination the wound is clean and healing well.  There is no evidence for infection.  Healing ridge is prominent as expected.    I explained that the inflammatory ridge that is palpable last for at least 2 months.  This may be related.  I ordered lidocaine patch to use as needed.  A follow-up office visit is planned.      Review of Systems       Objective   There were no vitals taken for this visit.     Physical Exam    Skin:     General: Skin is dry.      Comments: Incision is clean and healing well.

## 2025-07-15 ENCOUNTER — OFFICE VISIT (OUTPATIENT)
Dept: SURGERY | Facility: CLINIC | Age: 65
End: 2025-07-15

## 2025-07-15 DIAGNOSIS — K40.90 NON-RECURRENT UNILATERAL INGUINAL HERNIA WITHOUT OBSTRUCTION OR GANGRENE: Primary | ICD-10-CM

## 2025-07-15 PROCEDURE — 99024 POSTOP FOLLOW-UP VISIT: CPT | Performed by: SURGERY

## 2025-07-15 RX ORDER — LIDOCAINE 50 MG/G
1 PATCH TOPICAL DAILY
Qty: 20 PATCH | Refills: 2 | Status: SHIPPED | OUTPATIENT
Start: 2025-07-15

## (undated) DEVICE — BASIC SINGLE BASIN-LF: Brand: MEDLINE INDUSTRIES, INC.

## (undated) DEVICE — PENCILETTE PUSH BUTTON COATED

## (undated) DEVICE — REM POLYHESIVE ADULT PATIENT RETURN ELECTRODE: Brand: VALLEYLAB

## (undated) DEVICE — DECANTER: Brand: UNBRANDED

## (undated) DEVICE — INVIEW CLEAR LEGGINGS: Brand: CONVERTORS

## (undated) DEVICE — GLOVE SRG BIOGEL 8

## (undated) DEVICE — BETHLEHEM UNIVERSAL MINOR GEN: Brand: CARDINAL HEALTH

## (undated) DEVICE — BAG URINE DRAINAGE 2000ML ANTI RFLX LF

## (undated) DEVICE — TOWEL SURG XR DETECT GREEN STRL RFD

## (undated) DEVICE — CHEMOTHERAPY CONTAINER,HINGED LID, YELLOW: Brand: SHARPSAFETY

## (undated) DEVICE — SCD SEQUENTIAL COMPRESSION COMFORT SLEEVE MEDIUM KNEE LENGTH: Brand: KENDALL SCD

## (undated) DEVICE — ASTOUND STANDARD SURGICAL GOWN, XL: Brand: CONVERTORS

## (undated) DEVICE — TELFA NON-ADHERENT ABSORBENT DRESSING: Brand: TELFA

## (undated) DEVICE — EVACUATOR BLADDER ELLIK DISP STRL

## (undated) DEVICE — SUT VICRYL 1 CT-1 27 IN J261H

## (undated) DEVICE — TUBING SUCTION 5MM X 12 FT

## (undated) DEVICE — INSULATED BLADE ELECTRODE: Brand: EDGE

## (undated) DEVICE — EXOFIN PRECISION PEN HIGH VISCOSITY TOPICAL SKIN ADHESIVE: Brand: EXOFIN PRECISION PEN, 1G

## (undated) DEVICE — SUT VICRYL 2-0 SH 27 IN UNDYED J417H

## (undated) DEVICE — ANTIBACTERIAL UNDYED BRAIDED (POLYGLACTIN 910), SYNTHETIC ABSORBABLE SUTURE: Brand: COATED VICRYL

## (undated) DEVICE — SUT VICRYL PLUS 2-0 54IN VCP286G

## (undated) DEVICE — SUT MONOCRYL 4-0 PS-2 27 IN Y426H

## (undated) DEVICE — SUT PDS II 3-0 SH 27 IN Z316H

## (undated) DEVICE — SYRINGE 20ML LL

## (undated) DEVICE — Device: Brand: OLYMPUS

## (undated) DEVICE — BAG DECANTER

## (undated) DEVICE — UROCATCH BAG

## (undated) DEVICE — CYSTO TUBING TUR Y IRRIGATION

## (undated) DEVICE — PACK TUR

## (undated) DEVICE — NEEDLE 20 G X 1 1/2

## (undated) DEVICE — ANTIBACTERIAL UNDYED BRAIDED (POLYGLACTIN 910), SYNTHETIC ABSORBABLE SURGICAL SUTURE: Brand: COATED VICRYL

## (undated) DEVICE — GLOVE SRG BIOGEL ECLIPSE 7

## (undated) DEVICE — GLOVE SRG BIOGEL 7.5